# Patient Record
Sex: FEMALE | Race: WHITE | NOT HISPANIC OR LATINO | Employment: UNEMPLOYED | URBAN - METROPOLITAN AREA
[De-identification: names, ages, dates, MRNs, and addresses within clinical notes are randomized per-mention and may not be internally consistent; named-entity substitution may affect disease eponyms.]

---

## 2023-12-28 ENCOUNTER — APPOINTMENT (OUTPATIENT)
Dept: RADIOLOGY | Facility: MEDICAL CENTER | Age: 63
DRG: 441 | End: 2023-12-28
Attending: STUDENT IN AN ORGANIZED HEALTH CARE EDUCATION/TRAINING PROGRAM
Payer: MEDICAID

## 2023-12-28 ENCOUNTER — APPOINTMENT (OUTPATIENT)
Dept: RADIOLOGY | Facility: MEDICAL CENTER | Age: 63
DRG: 441 | End: 2023-12-28
Attending: INTERNAL MEDICINE
Payer: MEDICAID

## 2023-12-28 ENCOUNTER — HOSPITAL ENCOUNTER (INPATIENT)
Facility: MEDICAL CENTER | Age: 63
LOS: 6 days | DRG: 441 | End: 2024-01-03
Attending: STUDENT IN AN ORGANIZED HEALTH CARE EDUCATION/TRAINING PROGRAM | Admitting: INTERNAL MEDICINE
Payer: MEDICAID

## 2023-12-28 DIAGNOSIS — K70.11 ALCOHOLIC HEPATITIS WITH ASCITES: ICD-10-CM

## 2023-12-28 DIAGNOSIS — J96.21 ACUTE ON CHRONIC RESPIRATORY FAILURE WITH HYPOXIA AND HYPERCAPNIA (HCC): ICD-10-CM

## 2023-12-28 DIAGNOSIS — Z87.19 HISTORY OF CIRRHOSIS: ICD-10-CM

## 2023-12-28 DIAGNOSIS — N17.9 ACUTE KIDNEY INJURY (HCC): ICD-10-CM

## 2023-12-28 DIAGNOSIS — K74.60 CIRRHOSIS OF LIVER WITH ASCITES, UNSPECIFIED HEPATIC CIRRHOSIS TYPE (HCC): ICD-10-CM

## 2023-12-28 DIAGNOSIS — J44.9 CHRONIC OBSTRUCTIVE PULMONARY DISEASE, UNSPECIFIED COPD TYPE (HCC): ICD-10-CM

## 2023-12-28 DIAGNOSIS — E87.5 HYPERKALEMIA: ICD-10-CM

## 2023-12-28 DIAGNOSIS — D75.1 ERYTHROCYTOSIS: ICD-10-CM

## 2023-12-28 DIAGNOSIS — J96.01 ACUTE HYPOXEMIC RESPIRATORY FAILURE (HCC): ICD-10-CM

## 2023-12-28 DIAGNOSIS — R18.8 CIRRHOSIS OF LIVER WITH ASCITES, UNSPECIFIED HEPATIC CIRRHOSIS TYPE (HCC): ICD-10-CM

## 2023-12-28 DIAGNOSIS — L03.116 CELLULITIS OF LEFT LOWER EXTREMITY: ICD-10-CM

## 2023-12-28 DIAGNOSIS — R60.1 ANASARCA: ICD-10-CM

## 2023-12-28 DIAGNOSIS — J96.22 ACUTE ON CHRONIC RESPIRATORY FAILURE WITH HYPOXIA AND HYPERCAPNIA (HCC): ICD-10-CM

## 2023-12-28 DIAGNOSIS — E11.9 TYPE 2 DIABETES MELLITUS WITHOUT COMPLICATION, WITHOUT LONG-TERM CURRENT USE OF INSULIN (HCC): ICD-10-CM

## 2023-12-28 DIAGNOSIS — I27.20 PULMONARY HYPERTENSION (HCC): ICD-10-CM

## 2023-12-28 PROBLEM — G93.40 ENCEPHALOPATHY ACUTE: Status: ACTIVE | Noted: 2023-12-28

## 2023-12-28 PROBLEM — J96.90 RESPIRATORY FAILURE (HCC): Status: ACTIVE | Noted: 2023-12-28

## 2023-12-28 PROBLEM — R79.89 ELEVATED TROPONIN: Status: ACTIVE | Noted: 2023-12-28

## 2023-12-28 LAB
ALBUMIN FLD-MCNC: 2.2 G/DL
ALBUMIN SERPL BCP-MCNC: 4 G/DL (ref 3.2–4.9)
ALBUMIN/GLOB SERPL: 1.3 G/DL
ALP SERPL-CCNC: 122 U/L (ref 30–99)
ALT SERPL-CCNC: 11 U/L (ref 2–50)
AMMONIA PLAS-SCNC: 25 UMOL/L (ref 11–45)
AMPHET UR QL SCN: NEGATIVE
ANION GAP SERPL CALC-SCNC: 11 MMOL/L (ref 7–16)
ANION GAP SERPL CALC-SCNC: 12 MMOL/L (ref 7–16)
ANION GAP SERPL CALC-SCNC: 14 MMOL/L (ref 7–16)
ANION GAP SERPL CALC-SCNC: 15 MMOL/L (ref 7–16)
APPEARANCE FLD: NORMAL
APPEARANCE UR: CLEAR
AST SERPL-CCNC: 28 U/L (ref 12–45)
BACTERIA #/AREA URNS HPF: NEGATIVE /HPF
BARBITURATES UR QL SCN: NEGATIVE
BASE EXCESS BLDV CALC-SCNC: -6 MMOL/L
BASOPHILS # BLD AUTO: 0.6 % (ref 0–1.8)
BASOPHILS # BLD: 0.06 K/UL (ref 0–0.12)
BENZODIAZ UR QL SCN: NEGATIVE
BILIRUB SERPL-MCNC: 1.3 MG/DL (ref 0.1–1.5)
BILIRUB UR QL STRIP.AUTO: NEGATIVE
BODY FLD TYPE: NORMAL
BODY FLD TYPE: NORMAL
BODY TEMPERATURE: 36.4 CENTIGRADE
BUN SERPL-MCNC: 51 MG/DL (ref 8–22)
BUN SERPL-MCNC: 52 MG/DL (ref 8–22)
BUN SERPL-MCNC: 56 MG/DL (ref 8–22)
BUN SERPL-MCNC: 56 MG/DL (ref 8–22)
BUN SERPL-MCNC: 57 MG/DL (ref 8–22)
BUN SERPL-MCNC: 57 MG/DL (ref 8–22)
BZE UR QL SCN: NEGATIVE
CALCIUM ALBUM COR SERPL-MCNC: 9.1 MG/DL (ref 8.5–10.5)
CALCIUM SERPL-MCNC: 8.6 MG/DL (ref 8.5–10.5)
CALCIUM SERPL-MCNC: 9 MG/DL (ref 8.5–10.5)
CALCIUM SERPL-MCNC: 9.1 MG/DL (ref 8.5–10.5)
CALCIUM SERPL-MCNC: 9.2 MG/DL (ref 8.5–10.5)
CANNABINOIDS UR QL SCN: NEGATIVE
CELLS FLD: 9
CHLORIDE SERPL-SCNC: 100 MMOL/L (ref 96–112)
CHLORIDE SERPL-SCNC: 101 MMOL/L (ref 96–112)
CHLORIDE SERPL-SCNC: 102 MMOL/L (ref 96–112)
CK SERPL-CCNC: 37 U/L (ref 0–154)
CO2 SERPL-SCNC: 20 MMOL/L (ref 20–33)
CO2 SERPL-SCNC: 21 MMOL/L (ref 20–33)
CO2 SERPL-SCNC: 22 MMOL/L (ref 20–33)
CO2 SERPL-SCNC: 23 MMOL/L (ref 20–33)
CO2 SERPL-SCNC: 25 MMOL/L (ref 20–33)
CO2 SERPL-SCNC: 25 MMOL/L (ref 20–33)
COLOR FLD: YELLOW
COLOR UR: YELLOW
CREAT SERPL-MCNC: 1.39 MG/DL (ref 0.5–1.4)
CREAT SERPL-MCNC: 1.39 MG/DL (ref 0.5–1.4)
CREAT SERPL-MCNC: 1.48 MG/DL (ref 0.5–1.4)
CREAT SERPL-MCNC: 1.49 MG/DL (ref 0.5–1.4)
CREAT SERPL-MCNC: 1.5 MG/DL (ref 0.5–1.4)
CREAT SERPL-MCNC: 1.57 MG/DL (ref 0.5–1.4)
CREAT UR-MCNC: 105.82 MG/DL
CREAT UR-MCNC: 36.02 MG/DL
CRP SERPL HS-MCNC: 7.85 MG/DL (ref 0–0.75)
D DIMER PPP IA.FEU-MCNC: 4.02 UG/ML (FEU) (ref 0–0.5)
EKG IMPRESSION: NORMAL
EKG IMPRESSION: NORMAL
EOSINOPHIL # BLD AUTO: 0.02 K/UL (ref 0–0.51)
EOSINOPHIL NFR BLD: 0.2 % (ref 0–6.9)
EPI CELLS #/AREA URNS HPF: NEGATIVE /HPF
ERYTHROCYTE [DISTWIDTH] IN BLOOD BY AUTOMATED COUNT: 61.7 FL (ref 35.9–50)
EST. AVERAGE GLUCOSE BLD GHB EST-MCNC: 137 MG/DL
FENTANYL UR QL: NEGATIVE
FLUAV RNA SPEC QL NAA+PROBE: NEGATIVE
FLUBV RNA SPEC QL NAA+PROBE: NEGATIVE
GFR SERPLBLD CREATININE-BSD FMLA CKD-EPI: 37 ML/MIN/1.73 M 2
GFR SERPLBLD CREATININE-BSD FMLA CKD-EPI: 39 ML/MIN/1.73 M 2
GFR SERPLBLD CREATININE-BSD FMLA CKD-EPI: 39 ML/MIN/1.73 M 2
GFR SERPLBLD CREATININE-BSD FMLA CKD-EPI: 40 ML/MIN/1.73 M 2
GFR SERPLBLD CREATININE-BSD FMLA CKD-EPI: 43 ML/MIN/1.73 M 2
GFR SERPLBLD CREATININE-BSD FMLA CKD-EPI: 43 ML/MIN/1.73 M 2
GLOBULIN SER CALC-MCNC: 3.2 G/DL (ref 1.9–3.5)
GLUCOSE BLD STRIP.AUTO-MCNC: 107 MG/DL (ref 65–99)
GLUCOSE BLD STRIP.AUTO-MCNC: 112 MG/DL (ref 65–99)
GLUCOSE BLD STRIP.AUTO-MCNC: 95 MG/DL (ref 65–99)
GLUCOSE FLD-MCNC: 92 MG/DL
GLUCOSE SERPL-MCNC: 115 MG/DL (ref 65–99)
GLUCOSE SERPL-MCNC: 115 MG/DL (ref 65–99)
GLUCOSE SERPL-MCNC: 131 MG/DL (ref 65–99)
GLUCOSE SERPL-MCNC: 91 MG/DL (ref 65–99)
GLUCOSE SERPL-MCNC: 92 MG/DL (ref 65–99)
GLUCOSE SERPL-MCNC: 93 MG/DL (ref 65–99)
GLUCOSE UR STRIP.AUTO-MCNC: NEGATIVE MG/DL
GRAM STN SPEC: NORMAL
HAV IGM SERPL QL IA: NORMAL
HBA1C MFR BLD: 6.4 % (ref 4–5.6)
HBV CORE IGM SER QL: NORMAL
HBV SURFACE AG SER QL: NORMAL
HCO3 BLDV-SCNC: 26 MMOL/L (ref 24–28)
HCT VFR BLD AUTO: 53 % (ref 37–47)
HCV AB SER QL: NORMAL
HGB BLD-MCNC: 16.7 G/DL (ref 12–16)
HYALINE CASTS #/AREA URNS LPF: ABNORMAL /LPF
IMM GRANULOCYTES # BLD AUTO: 0.1 K/UL (ref 0–0.11)
IMM GRANULOCYTES NFR BLD AUTO: 1.1 % (ref 0–0.9)
INHALED O2 FLOW RATE: ABNORMAL L/MIN
INR PPP: 1.47 (ref 0.87–1.13)
KETONES UR STRIP.AUTO-MCNC: NEGATIVE MG/DL
LACTATE SERPL-SCNC: 1.6 MMOL/L (ref 0.5–2)
LEUKOCYTE ESTERASE UR QL STRIP.AUTO: NEGATIVE
LYMPHOCYTES # BLD AUTO: 1.34 K/UL (ref 1–4.8)
LYMPHOCYTES NFR BLD: 14.2 % (ref 22–41)
LYMPHOCYTES NFR FLD: 10 %
MCH RBC QN AUTO: 34.2 PG (ref 27–33)
MCHC RBC AUTO-ENTMCNC: 31.5 G/DL (ref 32.2–35.5)
MCV RBC AUTO: 108.6 FL (ref 81.4–97.8)
METHADONE UR QL SCN: NEGATIVE
MICRO URNS: ABNORMAL
MONOCYTES # BLD AUTO: 1.67 K/UL (ref 0–0.85)
MONOCYTES NFR BLD AUTO: 17.7 % (ref 0–13.4)
MONOS+MACROS NFR FLD MANUAL: 64 %
NEUTROPHILS # BLD AUTO: 6.26 K/UL (ref 1.82–7.42)
NEUTROPHILS NFR BLD: 66.2 % (ref 44–72)
NEUTROPHILS NFR FLD: 17 %
NITRITE UR QL STRIP.AUTO: NEGATIVE
NRBC # BLD AUTO: 0 K/UL
NRBC BLD-RTO: 0 /100 WBC (ref 0–0.2)
NUC CELL # FLD: 416 CELLS/UL
OPIATES UR QL SCN: NEGATIVE
OXYCODONE UR QL SCN: NEGATIVE
PCO2 BLDV: 79.1 MMHG (ref 41–51)
PCO2 TEMP ADJ BLDV: 77.1 MMHG (ref 41–51)
PCP UR QL SCN: NEGATIVE
PH BLDV: 7.13 [PH] (ref 7.31–7.45)
PH TEMP ADJ BLDV: 7.14 [PH] (ref 7.31–7.45)
PH UR STRIP.AUTO: 5 [PH] (ref 5–8)
PLATELET # BLD AUTO: 180 K/UL (ref 164–446)
PMV BLD AUTO: 10.4 FL (ref 9–12.9)
PO2 BLDV: 48.8 MMHG (ref 25–40)
PO2 TEMP ADJ BLDV: 46.8 MMHG (ref 25–40)
POTASSIUM SERPL-SCNC: 5.2 MMOL/L (ref 3.6–5.5)
POTASSIUM SERPL-SCNC: 5.3 MMOL/L (ref 3.6–5.5)
POTASSIUM SERPL-SCNC: 5.6 MMOL/L (ref 3.6–5.5)
POTASSIUM SERPL-SCNC: 5.8 MMOL/L (ref 3.6–5.5)
POTASSIUM SERPL-SCNC: 6.1 MMOL/L (ref 3.6–5.5)
POTASSIUM SERPL-SCNC: 7 MMOL/L (ref 3.6–5.5)
PROCALCITONIN SERPL-MCNC: 0.28 NG/ML
PROPOXYPH UR QL SCN: NEGATIVE
PROT SERPL-MCNC: 7.2 G/DL (ref 6–8.2)
PROT UR QL STRIP: 100 MG/DL
PROT UR-MCNC: 11 MG/DL (ref 0–15)
PROT/CREAT UR: 305 MG/G (ref 10–107)
PROTHROMBIN TIME: 18 SEC (ref 12–14.6)
RBC # BLD AUTO: 4.88 M/UL (ref 4.2–5.4)
RBC # FLD: 2000 CELLS/UL
RBC # URNS HPF: ABNORMAL /HPF
RBC UR QL AUTO: ABNORMAL
RSV RNA SPEC QL NAA+PROBE: NEGATIVE
SAO2 % BLDV: 73.2 %
SARS-COV-2 RNA RESP QL NAA+PROBE: NOTDETECTED
SCCMEC + MECA PNL NOSE NAA+PROBE: NEGATIVE
SIGNIFICANT IND 70042: NORMAL
SITE SITE: NORMAL
SODIUM SERPL-SCNC: 135 MMOL/L (ref 135–145)
SODIUM SERPL-SCNC: 137 MMOL/L (ref 135–145)
SODIUM SERPL-SCNC: 138 MMOL/L (ref 135–145)
SODIUM SERPL-SCNC: 138 MMOL/L (ref 135–145)
SODIUM UR-SCNC: 57 MMOL/L
SOURCE SOURCE: NORMAL
SP GR UR STRIP.AUTO: 1.01
SPECIMEN SOURCE: NORMAL
T4 FREE SERPL-MCNC: 0.98 NG/DL (ref 0.93–1.7)
T4 FREE SERPL-MCNC: 1.05 NG/DL (ref 0.93–1.7)
TROPONIN T SERPL-MCNC: 30 NG/L (ref 6–19)
TSH SERPL DL<=0.005 MIU/L-ACNC: 7.17 UIU/ML (ref 0.38–5.33)
UROBILINOGEN UR STRIP.AUTO-MCNC: 0.2 MG/DL
VIT B12 SERPL-MCNC: 3307 PG/ML (ref 211–911)
WBC # BLD AUTO: 9.5 K/UL (ref 4.8–10.8)
WBC #/AREA URNS HPF: ABNORMAL /HPF

## 2023-12-28 PROCEDURE — 80307 DRUG TEST PRSMV CHEM ANLYZR: CPT

## 2023-12-28 PROCEDURE — A9270 NON-COVERED ITEM OR SERVICE: HCPCS | Performed by: INTERNAL MEDICINE

## 2023-12-28 PROCEDURE — 99252 IP/OBS CONSLTJ NEW/EST SF 35: CPT | Performed by: INTERNAL MEDICINE

## 2023-12-28 PROCEDURE — 700102 HCHG RX REV CODE 250 W/ 637 OVERRIDE(OP): Performed by: INTERNAL MEDICINE

## 2023-12-28 PROCEDURE — 82945 GLUCOSE OTHER FLUID: CPT

## 2023-12-28 PROCEDURE — 96367 TX/PROPH/DG ADDL SEQ IV INF: CPT

## 2023-12-28 PROCEDURE — 82550 ASSAY OF CK (CPK): CPT

## 2023-12-28 PROCEDURE — 87070 CULTURE OTHR SPECIMN AEROBIC: CPT

## 2023-12-28 PROCEDURE — C1729 CATH, DRAINAGE: HCPCS

## 2023-12-28 PROCEDURE — 99291 CRITICAL CARE FIRST HOUR: CPT | Performed by: INTERNAL MEDICINE

## 2023-12-28 PROCEDURE — 94644 CONT INHLJ TX 1ST HOUR: CPT

## 2023-12-28 PROCEDURE — 82570 ASSAY OF URINE CREATININE: CPT

## 2023-12-28 PROCEDURE — 87641 MR-STAPH DNA AMP PROBE: CPT

## 2023-12-28 PROCEDURE — 85379 FIBRIN DEGRADATION QUANT: CPT

## 2023-12-28 PROCEDURE — 93010 ELECTROCARDIOGRAM REPORT: CPT | Performed by: STUDENT IN AN ORGANIZED HEALTH CARE EDUCATION/TRAINING PROGRAM

## 2023-12-28 PROCEDURE — 85610 PROTHROMBIN TIME: CPT

## 2023-12-28 PROCEDURE — 96375 TX/PRO/DX INJ NEW DRUG ADDON: CPT

## 2023-12-28 PROCEDURE — 87205 SMEAR GRAM STAIN: CPT

## 2023-12-28 PROCEDURE — 700105 HCHG RX REV CODE 258: Performed by: INTERNAL MEDICINE

## 2023-12-28 PROCEDURE — 99291 CRITICAL CARE FIRST HOUR: CPT

## 2023-12-28 PROCEDURE — 99292 CRITICAL CARE ADDL 30 MIN: CPT | Performed by: INTERNAL MEDICINE

## 2023-12-28 PROCEDURE — 86140 C-REACTIVE PROTEIN: CPT

## 2023-12-28 PROCEDURE — 82140 ASSAY OF AMMONIA: CPT

## 2023-12-28 PROCEDURE — 700111 HCHG RX REV CODE 636 W/ 250 OVERRIDE (IP): Mod: JZ | Performed by: INTERNAL MEDICINE

## 2023-12-28 PROCEDURE — 80074 ACUTE HEPATITIS PANEL: CPT

## 2023-12-28 PROCEDURE — 700102 HCHG RX REV CODE 250 W/ 637 OVERRIDE(OP): Performed by: STUDENT IN AN ORGANIZED HEALTH CARE EDUCATION/TRAINING PROGRAM

## 2023-12-28 PROCEDURE — 700111 HCHG RX REV CODE 636 W/ 250 OVERRIDE (IP): Performed by: STUDENT IN AN ORGANIZED HEALTH CARE EDUCATION/TRAINING PROGRAM

## 2023-12-28 PROCEDURE — 82042 OTHER SOURCE ALBUMIN QUAN EA: CPT

## 2023-12-28 PROCEDURE — 76775 US EXAM ABDO BACK WALL LIM: CPT

## 2023-12-28 PROCEDURE — 700105 HCHG RX REV CODE 258: Performed by: STUDENT IN AN ORGANIZED HEALTH CARE EDUCATION/TRAINING PROGRAM

## 2023-12-28 PROCEDURE — 85025 COMPLETE CBC W/AUTO DIFF WBC: CPT

## 2023-12-28 PROCEDURE — 36415 COLL VENOUS BLD VENIPUNCTURE: CPT

## 2023-12-28 PROCEDURE — 83036 HEMOGLOBIN GLYCOSYLATED A1C: CPT

## 2023-12-28 PROCEDURE — C9803 HOPD COVID-19 SPEC COLLECT: HCPCS | Performed by: STUDENT IN AN ORGANIZED HEALTH CARE EDUCATION/TRAINING PROGRAM

## 2023-12-28 PROCEDURE — 87040 BLOOD CULTURE FOR BACTERIA: CPT | Mod: 91

## 2023-12-28 PROCEDURE — 49082 ABD PARACENTESIS: CPT

## 2023-12-28 PROCEDURE — 700105 HCHG RX REV CODE 258

## 2023-12-28 PROCEDURE — 93970 EXTREMITY STUDY: CPT

## 2023-12-28 PROCEDURE — 82607 VITAMIN B-12: CPT

## 2023-12-28 PROCEDURE — 770022 HCHG ROOM/CARE - ICU (200)

## 2023-12-28 PROCEDURE — 93005 ELECTROCARDIOGRAM TRACING: CPT | Performed by: STUDENT IN AN ORGANIZED HEALTH CARE EDUCATION/TRAINING PROGRAM

## 2023-12-28 PROCEDURE — 307738 PARACENTESIS ABDOMINAL KIT

## 2023-12-28 PROCEDURE — 700101 HCHG RX REV CODE 250: Performed by: STUDENT IN AN ORGANIZED HEALTH CARE EDUCATION/TRAINING PROGRAM

## 2023-12-28 PROCEDURE — 84484 ASSAY OF TROPONIN QUANT: CPT

## 2023-12-28 PROCEDURE — 71045 X-RAY EXAM CHEST 1 VIEW: CPT

## 2023-12-28 PROCEDURE — 93970 EXTREMITY STUDY: CPT | Mod: 26 | Performed by: INTERNAL MEDICINE

## 2023-12-28 PROCEDURE — 94640 AIRWAY INHALATION TREATMENT: CPT

## 2023-12-28 PROCEDURE — 80053 COMPREHEN METABOLIC PANEL: CPT

## 2023-12-28 PROCEDURE — 83605 ASSAY OF LACTIC ACID: CPT

## 2023-12-28 PROCEDURE — 96376 TX/PRO/DX INJ SAME DRUG ADON: CPT

## 2023-12-28 PROCEDURE — 81001 URINALYSIS AUTO W/SCOPE: CPT

## 2023-12-28 PROCEDURE — 82803 BLOOD GASES ANY COMBINATION: CPT

## 2023-12-28 PROCEDURE — 80048 BASIC METABOLIC PNL TOTAL CA: CPT

## 2023-12-28 PROCEDURE — 700101 HCHG RX REV CODE 250: Performed by: INTERNAL MEDICINE

## 2023-12-28 PROCEDURE — 70450 CT HEAD/BRAIN W/O DYE: CPT

## 2023-12-28 PROCEDURE — 94660 CPAP INITIATION&MGMT: CPT

## 2023-12-28 PROCEDURE — 84300 ASSAY OF URINE SODIUM: CPT

## 2023-12-28 PROCEDURE — 96365 THER/PROPH/DIAG IV INF INIT: CPT

## 2023-12-28 PROCEDURE — 0241U HCHG SARS-COV-2 COVID-19 NFCT DS RESP RNA 4 TRGT MIC: CPT

## 2023-12-28 PROCEDURE — 84439 ASSAY OF FREE THYROXINE: CPT

## 2023-12-28 PROCEDURE — 89051 BODY FLUID CELL COUNT: CPT

## 2023-12-28 PROCEDURE — 99223 1ST HOSP IP/OBS HIGH 75: CPT | Performed by: INTERNAL MEDICINE

## 2023-12-28 PROCEDURE — 84443 ASSAY THYROID STIM HORMONE: CPT

## 2023-12-28 PROCEDURE — 84156 ASSAY OF PROTEIN URINE: CPT

## 2023-12-28 PROCEDURE — 84145 PROCALCITONIN (PCT): CPT

## 2023-12-28 PROCEDURE — 82962 GLUCOSE BLOOD TEST: CPT

## 2023-12-28 PROCEDURE — 700102 HCHG RX REV CODE 250 W/ 637 OVERRIDE(OP)

## 2023-12-28 PROCEDURE — 96372 THER/PROPH/DIAG INJ SC/IM: CPT

## 2023-12-28 RX ORDER — ONDANSETRON 4 MG/1
4 TABLET, ORALLY DISINTEGRATING ORAL EVERY 4 HOURS PRN
Status: DISCONTINUED | OUTPATIENT
Start: 2023-12-28 | End: 2024-01-03 | Stop reason: HOSPADM

## 2023-12-28 RX ORDER — PROMETHAZINE HYDROCHLORIDE 25 MG/1
12.5-25 TABLET ORAL EVERY 4 HOURS PRN
Status: DISCONTINUED | OUTPATIENT
Start: 2023-12-28 | End: 2024-01-03 | Stop reason: HOSPADM

## 2023-12-28 RX ORDER — FUROSEMIDE 10 MG/ML
80 INJECTION INTRAMUSCULAR; INTRAVENOUS EVERY 8 HOURS
Status: DISCONTINUED | OUTPATIENT
Start: 2023-12-28 | End: 2023-12-31

## 2023-12-28 RX ORDER — CALCIUM GLUCONATE 20 MG/ML
2 INJECTION, SOLUTION INTRAVENOUS ONCE
Status: COMPLETED | OUTPATIENT
Start: 2023-12-28 | End: 2023-12-28

## 2023-12-28 RX ORDER — ACETAMINOPHEN 325 MG/1
650 TABLET ORAL EVERY 6 HOURS PRN
Status: DISCONTINUED | OUTPATIENT
Start: 2023-12-28 | End: 2024-01-03 | Stop reason: HOSPADM

## 2023-12-28 RX ORDER — OXYCODONE HYDROCHLORIDE 5 MG/1
5 TABLET ORAL
Status: DISCONTINUED | OUTPATIENT
Start: 2023-12-28 | End: 2023-12-28

## 2023-12-28 RX ORDER — HYDROMORPHONE HYDROCHLORIDE 1 MG/ML
0.25 INJECTION, SOLUTION INTRAMUSCULAR; INTRAVENOUS; SUBCUTANEOUS
Status: DISCONTINUED | OUTPATIENT
Start: 2023-12-28 | End: 2023-12-28

## 2023-12-28 RX ORDER — HEPARIN SODIUM 5000 [USP'U]/ML
5000 INJECTION, SOLUTION INTRAVENOUS; SUBCUTANEOUS EVERY 8 HOURS
Status: DISCONTINUED | OUTPATIENT
Start: 2023-12-28 | End: 2023-12-29

## 2023-12-28 RX ORDER — MIDODRINE HYDROCHLORIDE 5 MG/1
5 TABLET ORAL EVERY 8 HOURS
Status: DISCONTINUED | OUTPATIENT
Start: 2023-12-28 | End: 2023-12-29

## 2023-12-28 RX ORDER — LABETALOL HYDROCHLORIDE 5 MG/ML
10 INJECTION, SOLUTION INTRAVENOUS EVERY 4 HOURS PRN
Status: DISCONTINUED | OUTPATIENT
Start: 2023-12-28 | End: 2024-01-03 | Stop reason: HOSPADM

## 2023-12-28 RX ORDER — AMOXICILLIN 250 MG
2 CAPSULE ORAL 2 TIMES DAILY
Status: DISCONTINUED | OUTPATIENT
Start: 2023-12-28 | End: 2024-01-03 | Stop reason: HOSPADM

## 2023-12-28 RX ORDER — ALBUTEROL SULFATE 90 UG/1
2 AEROSOL, METERED RESPIRATORY (INHALATION) EVERY 4 HOURS
Status: DISCONTINUED | OUTPATIENT
Start: 2023-12-28 | End: 2024-01-03 | Stop reason: HOSPADM

## 2023-12-28 RX ORDER — DEXMEDETOMIDINE HYDROCHLORIDE 4 UG/ML
.1-1.5 INJECTION, SOLUTION INTRAVENOUS CONTINUOUS
Status: DISCONTINUED | OUTPATIENT
Start: 2023-12-28 | End: 2023-12-30

## 2023-12-28 RX ORDER — ALBUMIN (HUMAN) 12.5 G/50ML
25 SOLUTION INTRAVENOUS 2 TIMES DAILY
Status: DISCONTINUED | OUTPATIENT
Start: 2023-12-28 | End: 2023-12-28

## 2023-12-28 RX ORDER — PROCHLORPERAZINE EDISYLATE 5 MG/ML
5-10 INJECTION INTRAMUSCULAR; INTRAVENOUS EVERY 4 HOURS PRN
Status: DISCONTINUED | OUTPATIENT
Start: 2023-12-28 | End: 2024-01-03 | Stop reason: HOSPADM

## 2023-12-28 RX ORDER — GABAPENTIN 300 MG/1
600 CAPSULE ORAL EVERY EVENING
Status: DISCONTINUED | OUTPATIENT
Start: 2023-12-28 | End: 2023-12-28

## 2023-12-28 RX ORDER — MIDODRINE HYDROCHLORIDE 5 MG/1
5 TABLET ORAL
Status: DISCONTINUED | OUTPATIENT
Start: 2023-12-28 | End: 2023-12-28

## 2023-12-28 RX ORDER — PROMETHAZINE HYDROCHLORIDE 25 MG/1
12.5-25 SUPPOSITORY RECTAL EVERY 4 HOURS PRN
Status: DISCONTINUED | OUTPATIENT
Start: 2023-12-28 | End: 2024-01-03 | Stop reason: HOSPADM

## 2023-12-28 RX ORDER — BISACODYL 10 MG
10 SUPPOSITORY, RECTAL RECTAL
Status: DISCONTINUED | OUTPATIENT
Start: 2023-12-28 | End: 2024-01-03 | Stop reason: HOSPADM

## 2023-12-28 RX ORDER — FUROSEMIDE 10 MG/ML
60 INJECTION INTRAMUSCULAR; INTRAVENOUS
Status: DISCONTINUED | OUTPATIENT
Start: 2023-12-28 | End: 2023-12-28

## 2023-12-28 RX ORDER — LACTULOSE 20 G/30ML
30 SOLUTION ORAL 3 TIMES DAILY
Status: DISCONTINUED | OUTPATIENT
Start: 2023-12-28 | End: 2023-12-29

## 2023-12-28 RX ORDER — OXYCODONE HYDROCHLORIDE 5 MG/1
2.5 TABLET ORAL
Status: DISCONTINUED | OUTPATIENT
Start: 2023-12-28 | End: 2023-12-28

## 2023-12-28 RX ORDER — POLYETHYLENE GLYCOL 3350 17 G/17G
1 POWDER, FOR SOLUTION ORAL
Status: DISCONTINUED | OUTPATIENT
Start: 2023-12-28 | End: 2024-01-03 | Stop reason: HOSPADM

## 2023-12-28 RX ORDER — ONDANSETRON 2 MG/ML
4 INJECTION INTRAMUSCULAR; INTRAVENOUS EVERY 4 HOURS PRN
Status: DISCONTINUED | OUTPATIENT
Start: 2023-12-28 | End: 2024-01-03 | Stop reason: HOSPADM

## 2023-12-28 RX ORDER — LINEZOLID 2 MG/ML
600 INJECTION, SOLUTION INTRAVENOUS EVERY 12 HOURS
Status: DISCONTINUED | OUTPATIENT
Start: 2023-12-28 | End: 2023-12-29

## 2023-12-28 RX ORDER — FUROSEMIDE 10 MG/ML
40 INJECTION INTRAMUSCULAR; INTRAVENOUS ONCE
Status: DISCONTINUED | OUTPATIENT
Start: 2023-12-28 | End: 2023-12-28

## 2023-12-28 RX ADMIN — SODIUM BICARBONATE 50 MEQ: 84 INJECTION, SOLUTION INTRAVENOUS at 04:54

## 2023-12-28 RX ADMIN — HEPARIN SODIUM 5000 UNITS: 5000 INJECTION, SOLUTION INTRAVENOUS; SUBCUTANEOUS at 13:44

## 2023-12-28 RX ADMIN — HEPARIN SODIUM 5000 UNITS: 5000 INJECTION, SOLUTION INTRAVENOUS; SUBCUTANEOUS at 06:32

## 2023-12-28 RX ADMIN — LACTULOSE 30 ML: 20 SOLUTION ORAL at 08:19

## 2023-12-28 RX ADMIN — RIFAXIMIN 550 MG: 550 TABLET ORAL at 08:20

## 2023-12-28 RX ADMIN — DOCUSATE SODIUM 50 MG AND SENNOSIDES 8.6 MG 2 TABLET: 8.6; 5 TABLET, FILM COATED ORAL at 17:45

## 2023-12-28 RX ADMIN — CALCIUM GLUCONATE 2 G: 20 INJECTION, SOLUTION INTRAVENOUS at 03:57

## 2023-12-28 RX ADMIN — DEXTROSE MONOHYDRATE 25 G: 100 INJECTION, SOLUTION INTRAVENOUS at 04:50

## 2023-12-28 RX ADMIN — INSULIN HUMAN 4.5 UNITS: 100 INJECTION, SOLUTION PARENTERAL at 03:54

## 2023-12-28 RX ADMIN — ALBUTEROL SULFATE 2 PUFF: 90 AEROSOL, METERED RESPIRATORY (INHALATION) at 13:01

## 2023-12-28 RX ADMIN — MIDODRINE HYDROCHLORIDE 5 MG: 5 TABLET ORAL at 08:20

## 2023-12-28 RX ADMIN — ALBUTEROL SULFATE 2 PUFF: 90 AEROSOL, METERED RESPIRATORY (INHALATION) at 10:39

## 2023-12-28 RX ADMIN — FUROSEMIDE 80 MG: 10 INJECTION, SOLUTION INTRAMUSCULAR; INTRAVENOUS at 13:44

## 2023-12-28 RX ADMIN — INSULIN HUMAN 4.5 UNITS: 100 INJECTION, SOLUTION PARENTERAL at 18:06

## 2023-12-28 RX ADMIN — FUROSEMIDE 60 MG: 10 INJECTION, SOLUTION INTRAMUSCULAR; INTRAVENOUS at 03:45

## 2023-12-28 RX ADMIN — MIDODRINE HYDROCHLORIDE 5 MG: 5 TABLET ORAL at 21:22

## 2023-12-28 RX ADMIN — RIFAXIMIN 550 MG: 550 TABLET ORAL at 17:45

## 2023-12-28 RX ADMIN — LINEZOLID 600 MG: 600 INJECTION, SOLUTION INTRAVENOUS at 17:42

## 2023-12-28 RX ADMIN — DEXMEDETOMIDINE 0.2 MCG/KG/HR: 100 INJECTION, SOLUTION INTRAVENOUS at 08:42

## 2023-12-28 RX ADMIN — Medication 10 MG: at 02:53

## 2023-12-28 RX ADMIN — LACTULOSE 30 ML: 20 SOLUTION ORAL at 12:43

## 2023-12-28 RX ADMIN — CEFEPIME 2 G: 2 INJECTION, POWDER, FOR SOLUTION INTRAVENOUS at 08:31

## 2023-12-28 RX ADMIN — FUROSEMIDE 80 MG: 10 INJECTION, SOLUTION INTRAMUSCULAR; INTRAVENOUS at 21:22

## 2023-12-28 RX ADMIN — ALBUTEROL SULFATE 2 PUFF: 90 AEROSOL, METERED RESPIRATORY (INHALATION) at 17:45

## 2023-12-28 RX ADMIN — LACTULOSE 30 ML: 20 SOLUTION ORAL at 17:45

## 2023-12-28 RX ADMIN — MIDODRINE HYDROCHLORIDE 5 MG: 5 TABLET ORAL at 13:44

## 2023-12-28 RX ADMIN — DEXTROSE MONOHYDRATE 25 G: 100 INJECTION, SOLUTION INTRAVENOUS at 17:41

## 2023-12-28 RX ADMIN — LINEZOLID 600 MG: 600 INJECTION, SOLUTION INTRAVENOUS at 06:27

## 2023-12-28 RX ADMIN — HEPARIN SODIUM 5000 UNITS: 5000 INJECTION, SOLUTION INTRAVENOUS; SUBCUTANEOUS at 21:22

## 2023-12-28 RX ADMIN — ALBUTEROL SULFATE 2 PUFF: 90 AEROSOL, METERED RESPIRATORY (INHALATION) at 21:31

## 2023-12-28 ASSESSMENT — ENCOUNTER SYMPTOMS
NAUSEA: 0
ROS GI COMMENTS: ABDOMINAL SWELLING
FEVER: 0
BACK PAIN: 0
SHORTNESS OF BREATH: 1
NECK PAIN: 0
EYE REDNESS: 0
ABDOMINAL PAIN: 0
SPEECH CHANGE: 0
SPUTUM PRODUCTION: 0
VOMITING: 0
HEADACHES: 0
CHILLS: 0
FLANK PAIN: 0
DEPRESSION: 0
WEAKNESS: 1
SORE THROAT: 0
DIZZINESS: 0
ORTHOPNEA: 1
EYE DISCHARGE: 0
PALPITATIONS: 0
COUGH: 0
BRUISES/BLEEDS EASILY: 0
NERVOUS/ANXIOUS: 0
SENSORY CHANGE: 0

## 2023-12-28 ASSESSMENT — PULMONARY FUNCTION TESTS
EPAP_CMH2O: 6

## 2023-12-28 ASSESSMENT — LIFESTYLE VARIABLES
TOTAL SCORE: 0
HAVE PEOPLE ANNOYED YOU BY CRITICIZING YOUR DRINKING: NO
EVER FELT BAD OR GUILTY ABOUT YOUR DRINKING: NO
AVERAGE NUMBER OF DAYS PER WEEK YOU HAVE A DRINK CONTAINING ALCOHOL: 0
HOW MANY TIMES IN THE PAST YEAR HAVE YOU HAD 5 OR MORE DRINKS IN A DAY: 0
TOTAL SCORE: 0
SUBSTANCE_ABUSE: 0
HAVE YOU EVER FELT YOU SHOULD CUT DOWN ON YOUR DRINKING: NO
ALCOHOL_USE: NO
TOTAL SCORE: 0
EVER HAD A DRINK FIRST THING IN THE MORNING TO STEADY YOUR NERVES TO GET RID OF A HANGOVER: NO
ON A TYPICAL DAY WHEN YOU DRINK ALCOHOL HOW MANY DRINKS DO YOU HAVE: 0
CONSUMPTION TOTAL: NEGATIVE

## 2023-12-28 ASSESSMENT — FIBROSIS 4 INDEX
FIB4 SCORE: 2.48
FIB4 SCORE: 2.95
FIB4 SCORE: 2.95

## 2023-12-28 ASSESSMENT — PAIN DESCRIPTION - PAIN TYPE
TYPE: ACUTE PAIN
TYPE: ACUTE PAIN

## 2023-12-28 ASSESSMENT — PATIENT HEALTH QUESTIONNAIRE - PHQ9
2. FEELING DOWN, DEPRESSED, IRRITABLE, OR HOPELESS: NOT AT ALL
SUM OF ALL RESPONSES TO PHQ9 QUESTIONS 1 AND 2: 0
1. LITTLE INTEREST OR PLEASURE IN DOING THINGS: NOT AT ALL

## 2023-12-28 NOTE — ED NOTES
ICU RN at bedside to transport pt upstairs, pt placed on portable monitor, belongings and paperwork at bedside

## 2023-12-28 NOTE — ASSESSMENT & PLAN NOTE
Clinically looks like demand ischemia  Trop marginally elevated   TTE pending  EKG reassuring  Cont Tele  No CP or apparent CP equivalent

## 2023-12-28 NOTE — ASSESSMENT & PLAN NOTE
Related to hypercarbic respiratory failure  Ammonia at 37  Avoid sedatives  Non focal exam  Improving

## 2023-12-28 NOTE — ED NOTES
Bedside report received by PRATIBHA Ficth    Oxygen:BiPap  Fall Risk status: bed in lowest position/locked, call light within reach  Patient Mentation:A+O x 4  Continuous cardiac monitoring:NSR  Report called by PRATIBHA Fitch to ICU RN, awaiting pt to be transported to floor

## 2023-12-28 NOTE — ASSESSMENT & PLAN NOTE
At this time she requires 5 to 8 L oxygen mask.  History of COPD, currently not in exacerbation  Etiology may include hepatopulmonary syndrome, congestive heart failure, pneumonia  Chest x-ray showed hazy bilateral opacities  BNP is elevated at 13,000  Plan to continue forced diuresis IV Lasix 60 mg twice daily  Obtain transthoracic echo, D-dimer  Monitor daily weight, input and output

## 2023-12-28 NOTE — H&P
Hospital Medicine History & Physical Note    Date of Service  12/28/2023    Primary Care Physician  Pcp Pt States None        Code Status  Full Code    Chief Complaint  Chief Complaint   Patient presents with    Shortness of Breath     BIB EMS; transfer from [place]; SOB x 1 week; denies any chest pain, N/V; AOX4 GCS15       History of Presenting Illness  Yas Bernard is a 63 y.o. female with history of cirrhosis, COPD, pulmonary hypertension, type 2 diabetes, ascending aortic aneurysm, nicotine dependence, remote history of alcohol abuse quit 10 years ago, who presented 12/28/2023 as a transfer from [facility] where she presented for evaluation for shortness of breath in the last 2 weeks worsening on exertion.  She was noted to have elevated creatinine and potassium in, as well as hypoxia and transferred here with concern for possible hepatorenal syndrome.  She had to be placed on 5 to 8 L oxygen mask for hypoxia with desaturation to 70%.  Reportedly she was taking Lasix at home without improvement of her shortness of breath and abdominal swelling.    She states she was taking ibuprofen on and off for occasional abdominal pain and bilateral leg pain.  Bedside paracentesis was done with at least 4 L of fluid removed.  Patient is falling asleep and could not stay Awake for conversation.  ERP has concern for possible underlying heart failure, given cardiomegaly on chest x-ray, bedside anasarca secondary to liver disease..    I discussed the plan of care with patient and ERP .    Review of Systems  Review of Systems   Unable to perform ROS: Mental acuity       Past Medical History   has a past medical history of Chronic obstructive pulmonary disease (HCC) and Cirrhosis (HCC).    Surgical History   has no past surgical history on file.     Family History  family history is not on file.   Family history reviewed with patient. There is no family history that is pertinent to the chief complaint.      Social History   reports that she has never smoked. She has never used smokeless tobacco. She reports that she does not currently use alcohol. She reports that she does not use drugs.    Allergies  Allergies   Allergen Reactions    Pcn [Penicillins]        Medications  Prior to Admission Medications   Prescriptions Last Dose Informant Patient Reported? Taking?   albuterol (VENTOLIN OR PROVENTIL) 108 (90 BASE) MCG/ACT AERS   No No   Sig: Inhale 2 Puffs by mouth every 4 hours.   clindamycin (CLEOCIN T) 1 % GEL   No No   Sig: Apply  to affected area(s) 2 Times a Day. use thin film on affected area   clobetasol (TEMOVATE) 0.05 % OINT   No No   Sig: Apply  to affected area(s) 2 Times a Day. Apply as directed above   diphenhydramine-ZnAcetate (BENADRYL ITCH) 1-0.1 % CREA   No No   Sig: Apply  to affected area(s) 2 Times a Day. Apply as directed   gabapentin (NEURONTIN) 300 MG Cap   Yes No   Sig: Take 600 mg by mouth 4 times a day.   pramoxine-calamine 1-8% (CALADRYL) lotion   No No   Sig: Apply 1 Inch to affected area(s) 4 times a day.      Facility-Administered Medications: None       Physical Exam  Temp:  [36.4 °C (97.6 °F)-36.6 °C (97.9 °F)] 36.6 °C (97.9 °F)  Pulse:  [75-81] 75  Resp:  [16-24] 17  BP: (114-138)/(60-79) 128/60  SpO2:  [70 %-94 %] 90 %  Blood Pressure: 128/60   Temperature: 36.6 °C (97.9 °F)   Pulse: 75   Respiration: 17   Pulse Oximetry: 90 %       Physical Exam  Vitals and nursing note reviewed.   Constitutional:       General: She is not in acute distress.     Appearance: Normal appearance.   HENT:      Head: Normocephalic and atraumatic.      Nose: Nose normal.      Mouth/Throat:      Mouth: Mucous membranes are moist.   Eyes:      Extraocular Movements: Extraocular movements intact.      Pupils: Pupils are equal, round, and reactive to light.   Cardiovascular:      Rate and Rhythm: Normal rate and regular rhythm.   Pulmonary:      Effort: Pulmonary effort is normal.      Breath sounds: Normal  breath sounds.   Abdominal:      General: Abdomen is flat. There is distension.      Tenderness: There is no abdominal tenderness. There is no guarding or rebound.   Musculoskeletal:         General: No swelling or deformity. Normal range of motion.      Cervical back: Normal range of motion and neck supple.      Right lower leg: Edema present.      Left lower leg: Edema present.   Skin:     General: Skin is warm and dry.   Neurological:      General: No focal deficit present.      Comments: Somnolent, unable to maintain conversation.  Moves all 4 extremities.  No lateralizing neurological signs.   Psychiatric:         Mood and Affect: Mood normal.         Behavior: Behavior normal.         Laboratory:  Recent Labs     12/27/23 2130 12/28/23 0034   WBC 8.4 9.5   RBC 5.00 4.88   HEMOGLOBIN 17.3* 16.7*   HEMATOCRIT 54.3* 53.0*   .6* 108.6*   MCH 34.6* 34.2*   MCHC 31.9* 31.5*   RDW 15.2* 61.7*   PLATELETCT 183 180   MPV 10.0 10.4     Recent Labs     12/27/23 2130 12/28/23 0034   SODIUM 135* 137   POTASSIUM 5.9* 6.1*   CHLORIDE 101 101   CO2 26 22   GLUCOSE 90 93   BUN 58* 57*   CREATININE 2.0* 1.50*   CALCIUM 9.3 9.1     Recent Labs     12/27/23 2130 12/28/23 0034   ALTSGPT 13 11   ASTSGOT 26 28   ALKPHOSPHAT 118* 122*   TBILIRUBIN 1.5* 1.3   DBILIRUBIN 0.9*  --    GLUCOSE 90 93     Recent Labs     12/27/23 2130 12/28/23 0034   INR 1.62 1.47*     Recent Labs     12/27/23 2130   NTPROBNP 92600*         Recent Labs     12/28/23  0034   TROPONINT 30*       Imaging:  DX-CHEST-LIMITED (1 VIEW)   Final Result         No pulmonary infiltrates or consolidations are noted.      Cardiomegaly.      EC-ECHOCARDIOGRAM COMPLETE W/O CONT    (Results Pending)       X-Ray:  I have personally reviewed the images and compared with prior images.    Assessment/Plan:  Justification for Admission Status  I anticipate this patient will require at least two midnights for appropriate medical management, necessitating inpatient  admission because heart failure, respiratory failure    Patient will need a Telemetry bed on MEDICAL service .  The need is secondary to respiratory failure, heart failure.    * Acute respiratory failure with hypoxia- (present on admission)  Assessment & Plan  At this time she requires 5 to 8 L oxygen mask.  History of COPD, currently not in exacerbation  Etiology may include hepatopulmonary syndrome, congestive heart failure, pneumonia  Chest x-ray showed hazy bilateral opacities  BNP is elevated at 13,000  Plan to continue forced diuresis IV Lasix 60 mg twice daily  Obtain transthoracic echo, D-dimer  Monitor daily weight, input and output      Acute kidney injury (HCC)  Assessment & Plan  Per chart review has normal baseline kidney function  Creatinine 2.0/58, 1.5/57  Etiology may include prerenal, as well as hepatorenal syndrome and NSAID nephropathy  Since patient clinically volume overloaded, will treat with IV Lasix  Midodrine and albumin infusions for possible hepatorenal syndrome  Bladder scan and kidney ultrasound to rule out obstruction    Encephalopathy acute  Assessment & Plan  Acute metabolic versus toxic  Plan: To check ammonia, vitamin B12, TSH, bladder scan, CT head  Fall, aspiration precautions    Hyperkalemia  Assessment & Plan  Potassium 6.1  ?  Secondary to above  EKG showed low voltage, nonspecific hyperkalemic changes  Patient received treatment with calcium gluconate, Lasix and albuterol in ER  Continue Lasix  Renal diet  Repeat chemistry panel    Elevated troponin  Assessment & Plan  Concern for possible underlying cardiomyopathy  Denies chest pain.  EKG showed low voltage.  Will repeat troponin and obtain transthoracic echo    COPD (chronic obstructive pulmonary disease) (HCC)- (present on admission)  Assessment & Plan  Not in exacerbation  Albuterol as needed  Supplemental oxygen    ASCITES  Assessment & Plan  Secondary to known liver cirrhosis  Status post evacuation of at least 4 L in ER  of transparent  brown liquid  Follow fluid analysis, consider empiric antibiotics for SBP if meets criteria  Albumin infusions          VTE prophylaxis: heparin ppx

## 2023-12-28 NOTE — CARE PLAN
The patient is Watcher - Medium risk of patient condition declining or worsening    Shift Goals  Clinical Goals: monitor labs, oxygenation  Patient Goals: eat  Family Goals: ANA    Progress made toward(s) clinical / shift goals:    Problem: Knowledge Deficit - Standard  Goal: Patient and family/care givers will demonstrate understanding of plan of care, disease process/condition, diagnostic tests and medications  Description: Target End Date:  1-3 days or as soon as patient condition allows    Document in Patient Education    1.  Patient and family/caregiver oriented to unit, equipment, visitation policy and means for communicating concern  2.  Complete/review Learning Assessment  3.  Assess knowledge level of disease process/condition, treatment plan, diagnostic tests and medications  4.  Explain disease process/condition, treatment plan, diagnostic tests and medications  Outcome: Progressing     Problem: Psychosocial  Goal: Patient's level of anxiety will decrease  Description: Target End Date:  1-3 days or as soon as patient condition allows    1.  Collaborate with patient and family/caregiver to identify triggers and develop strategies to cope with anxiety  2.  Implement stimuli reduction, calming techniques  3.  Pharmacologic management per provider order  4.  Encourage patient/family/care giver participation  5.  Collaborate with interdisciplinary team including Psychologist or Behavioral Health Team as needed  Outcome: Progressing     Problem: Pain - Standard  Goal: Alleviation of pain or a reduction in pain to the patient’s comfort goal  Description: Target End Date:  Prior to discharge or change in level of care    Document on Vitals flowsheet    1.  Document pain using the appropriate pain scale per order or unit policy  2.  Educate and implement non-pharmacologic comfort measures (i.e. relaxation, distraction, massage, cold/heat therapy, etc.)  3.  Pain management medications as ordered  4.  Reassess pain  after pain med administration per policy  5.  If opiods administered assess patient's response to pain medication is appropriate per POSS sedation scale  6.  Follow pain management plan developed in collaboration with patient and interdisciplinary team (including palliative care or pain specialists if applicable)  Outcome: Progressing       Patient is not progressing towards the following goals:

## 2023-12-28 NOTE — ASSESSMENT & PLAN NOTE
S/p 4L paracentesis in ER  No signs to suggest SBP  Cont aggressive diuresis with Lasix 80mg every 8 hours  Assess for needs for repeat paracentesis

## 2023-12-28 NOTE — CONSULTS
Date of Consultation:  12/28/2023    Patient: : Yas Bernard  MRN: [id number]    Referring Physician:  Dr. Adan Charlton     GI:Michelle Sierra M.D.     Reason for Consultation: Liver cirrhosis.    History of Present Illness:   The patient is a 63 years old female with medical history of cirrhosis possibly from fatty liver and alcohol use.  The patient presented to outside hospital for fatigue, shortness of breath for about 1 to 2 weeks.  The patient also feel distended in the abdomen.    At outside hospital, liver function suboptimal but relative stable however acute kidney injury with electrolyte abnormality noted.  The patient was sent to [facility] for higher level care.    We saw the patient at bedside in early morning, the patient has being BiPAP for breathing, 4 L of ascites was removed.    No acute upper GI symptoms or sign.      Past Medical History:   Diagnosis Date    Chronic obstructive pulmonary disease (HCC)     Cirrhosis (HCC)          History reviewed. No pertinent surgical history.    History reviewed. No pertinent family history.    Social History     Socioeconomic History    Marital status:    Tobacco Use    Smoking status: Never    Smokeless tobacco: Never   Vaping Use    Vaping Use: Never used   Substance and Sexual Activity    Alcohol use: Not Currently    Drug use: Never     Limited review of system because patient was sleeping with BiPAP.    HEENT: grossly normal.  Cardiovascular: Please refer to primary team's daily assessment.   Lungs: Please refer to primary team's daily assessment.   Abdomen: Soft, No tenderness, distended.  Skin: No erythema, No rash.          Physical Exam:  Vitals:    12/28/23 0540 12/28/23 0600 12/28/23 0632 12/28/23 0700   BP:  117/59  126/60   Pulse: 68 70 72 73   Resp: 20 20 18 18   Temp:       TempSrc:       SpO2: 92% 88% 93% 95%   Weight:       Height:                 Labs:  Recent Labs     12/27/23  2130 12/28/23  0034   WBC 8.4 9.5   RBC 5.00 4.88    HEMOGLOBIN 17.3* 16.7*   HEMATOCRIT 54.3* 53.0*   .6* 108.6*   MCH 34.6* 34.2*   MCHC 31.9* 31.5*   RDW 15.2* 61.7*   PLATELETCT 183 180   MPV 10.0 10.4     Recent Labs     12/28/23  0034 12/28/23  0253 12/28/23  0522   SODIUM 137 138 137   POTASSIUM 6.1* 7.0* 5.2   CHLORIDE 101 101 101   CO2 22 23 25   GLUCOSE 93 91 131*   BUN 57* 57* 56*   CPKTOTAL  --   --  37     Recent Labs     12/27/23 2130 12/28/23 0034   INR 1.62 1.47*       Recent Labs     12/27/23 2130 12/28/23 0034   ASTSGOT 26 28   ALTSGPT 13 11   TBILIRUBIN 1.5* 1.3   IBILIRUBIN 0.6  --    DBILIRUBIN 0.9*  --    ALKPHOSPHAT 118* 122*   GLOBULIN  --  3.2   INR 1.62 1.47*         Imaging:  CT-HEAD W/O  Narrative: 12/28/2023 5:19 AM    HISTORY/REASON FOR EXAM:  SHORTNESS OF BREATH.    TECHNIQUE/EXAM DESCRIPTION AND NUMBER OF VIEWS:  CT of the head without contrast.    The study was performed on a helical multidetector CT scanner. Contiguous axial sections were obtained from the skull base through the vertex. Up to date radiation dose reduction adjustments have been utilized to meet ALARA standards for radiation dose   reduction.    COMPARISON:  None.    FINDINGS:  Brain: No intracranial mass, hydrocephalus, herniation, hemorrhage, or extra-axial fluid collection. Normal gray-white matter differentiation.    Paranasal sinuses: Visualized portions of the paranasal sinuses and mastoid air cells are well aerated.  Impression: No acute process.  US-RENAL  Narrative: 12/28/2023 3:46 AM    HISTORY/REASON FOR EXAM:  Abnormal Labs    TECHNIQUE/EXAM DESCRIPTION:  Renal ultrasound.    COMPARISON:  None    FINDINGS:    RIGHT KIDNEY: No renal stone, hydronephrosis, or mass identified. Normal echogenicity.. The right kidney length measures 11.96 cm.    LEFT KIDNEY: No renal stone, hydronephrosis, or mass identified. Normal echogenicity.. The left kidney length measures 10.18 cm.    BLADDER: Love catheter balloon is present within a decompressed urinary  bladder.    VESSELS: Aortic and iliac arteries are normal diameter. Visualized portion of IVC appears normal.  Impression: Normal exam.  DX-CHEST-LIMITED (1 VIEW)  Narrative: 12/28/2023 12:40 AM    HISTORY/REASON FOR EXAM:  Shortness of Breath    TECHNIQUE/EXAM DESCRIPTION AND NUMBER OF VIEWS:  Single portable view of the chest.    COMPARISON: 6/10/2012    FINDINGS:    No pulmonary infiltrates or consolidations are noted.  No pleural effusion. No pneumothorax.  Enlarged cardiopericardial silhouette.  Impression: No pulmonary infiltrates or consolidations are noted.    Cardiomegaly.            Impressions:  The patient is a 63 years old female with medical history of cirrhosis possibly from fatty liver and alcohol use.  The patient presented to outside hospital for fatigue, shortness of breath for about 1 to 2 weeks.  The patient also feel distended in the abdomen.    Liver function, liver reserve is suboptimal due to cirrhosis however no overt acute change at this time.  Kidney injury is the acute issue, possibly hepatorenal syndrome or other etiology.    GI team agree current fluid status management by primary team, would appreciate nephrology consultation.    No other acute management change from a GI standpoint GI team will continue to follow-up.      This note was generated using voice recognition software which has a small chance of producing errors of grammar and possibly content. I have made every reasonable attempt to find and correct any obvious errors, but expect that some may not be found prior to finalization of this note.

## 2023-12-28 NOTE — ED NOTES
Drained 4L abdominal fluid; paracentesis needle removed by ERP; no bleeding noted on the insertion site; patient tolerated well

## 2023-12-28 NOTE — ASSESSMENT & PLAN NOTE
Hx of COPD   Likely symptoms of pulmonary hypertension causing shortness of breath  Check lower ext doppler: negative for DVT  BiPAP/CPAP at night  No wheezing on exam RT protocols  Goal sat 90-92% with her pulmonary hypertension  Continue aggressive diuresis, diamox 500mg IV x 1

## 2023-12-28 NOTE — PROGRESS NOTES
Critical Care Progress Note    Date of admission  12/28/2023    Chief Complaint  63 y.o. female admitted 12/28/2023 with acute hypoxic and hypercapnic respiratory failure requiring BiPAP    Hospital Course  Ms. Bernard is a 63 year old lady with the past medical history significant for pulmonary hypertension, cirrhosis due to alcohol abuse (quit 10 years ago) with ascites, COPD, type-II diabetes, tobacco abuse, and an ascending aortic aneurysm who went to [facility] on 12/27 with complaints of shortness of breath and leg swelling for 2 weeks. She was found to be fluid overload with an elevated creatinine and potassium with an oxygen saturation of 70%.  She was started on supplemental oxygen and transferred to [facility] ER for subspecialty care.  The patient underwent a 4 liter paracentesis and was given lasix.  She started to become sleepy and a VBG revealed a pH of 7.13 with a pCO2 of 79.  She was started on BiPAP and then admitted to the ICU for ongoing care.    Interval Problem Update  Reviewed last 24 hour events:   - admitted just this morning   - A/ox4   - SR 60-70s   - -110s   - Tmax 97.3   - low salt diet   - UOP of 500cc since admit, Love   - BM pta   - AVAPs: IPAP 14, EPAP 6, 45%   - Precedex 0.2   - heparin   - cefepime/linezold   - K now 5.2   - Creat 1.49    Review of Systems  Review of Systems   Constitutional:  Positive for malaise/fatigue. Negative for chills and fever.   HENT:  Negative for congestion and sore throat.    Eyes:  Negative for discharge and redness.   Respiratory:  Positive for shortness of breath. Negative for cough and sputum production.    Cardiovascular:  Positive for orthopnea and leg swelling. Negative for chest pain and palpitations.   Gastrointestinal:  Negative for abdominal pain, nausea and vomiting.        Abdominal swelling    Genitourinary:  Negative for flank pain and hematuria.   Musculoskeletal:  Negative for back pain and neck pain.   Skin:  Negative  for rash.   Neurological:  Positive for weakness. Negative for dizziness, sensory change, speech change and headaches.   Endo/Heme/Allergies:  Does not bruise/bleed easily.   Psychiatric/Behavioral:  Negative for depression and substance abuse. The patient is not nervous/anxious.         Vital Signs for last 24 hours   Temp:  [36.6 °C (97.9 °F)-36.9 °C (98.4 °F)] 36.9 °C (98.4 °F)  Pulse:  [68-81] 81  Resp:  [17-20] 18  BP: (117-140)/(59-79) 126/60  SpO2:  [85 %-97 %] 94 %    Hemodynamic parameters for last 24 hours       Respiratory Information for the last 24 hours       Physical Exam   Physical Exam  Vitals and nursing note reviewed.   Constitutional:       Appearance: She is obese. She is ill-appearing.      Comments: Sleepy but wakes easily, pt appears much older than stated age and chronically ill   HENT:      Head: Normocephalic and atraumatic.      Right Ear: External ear normal.      Left Ear: External ear normal.      Nose: Nose normal.      Mouth/Throat:      Mouth: Mucous membranes are dry.      Comments: BiPAP in place  Eyes:      General: No scleral icterus.     Conjunctiva/sclera: Conjunctivae normal.      Pupils: Pupils are equal, round, and reactive to light.   Cardiovascular:      Rate and Rhythm: Normal rate and regular rhythm.      Heart sounds: Normal heart sounds. No murmur heard.  Pulmonary:      Breath sounds: No wheezing.      Comments: Breathing comfortably on BiPAP, diminished  Chest:      Chest wall: No tenderness.   Abdominal:      Palpations: Abdomen is soft.      Tenderness: There is no abdominal tenderness. There is no guarding or rebound.   Genitourinary:     Comments: Love in place  Musculoskeletal:         General: Normal range of motion.      Cervical back: Normal range of motion and neck supple.      Right lower leg: Edema present.      Left lower leg: Edema present.   Lymphadenopathy:      Cervical: No cervical adenopathy.   Skin:     General: Skin is warm and dry.       Capillary Refill: Capillary refill takes 2 to 3 seconds.      Findings: No rash.   Neurological:      Mental Status: She is alert and oriented to person, place, and time.      Cranial Nerves: No cranial nerve deficit.      Sensory: No sensory deficit.      Motor: No weakness.   Psychiatric:         Mood and Affect: Mood normal.         Behavior: Behavior normal.         Medications  Current Facility-Administered Medications   Medication Dose Route Frequency Provider Last Rate Last Admin    senna-docusate (Pericolace Or Senokot S) 8.6-50 MG per tablet 2 Tablet  2 Tablet Oral BID Zan Barth M.D.        And    polyethylene glycol/lytes (Miralax) Packet 1 Packet  1 Packet Oral QDAY PRN Zan Barth M.D.        And    magnesium hydroxide (Milk Of Magnesia) suspension 30 mL  30 mL Oral QDAY PRN Zan Barth M.D.        And    bisacodyl (Dulcolax) suppository 10 mg  10 mg Rectal QDAY PRN Zan aBrth M.D.        heparin injection 5,000 Units  5,000 Units Subcutaneous Q8HRS Zan Barth M.D.   5,000 Units at 12/28/23 0632    acetaminophen (Tylenol) tablet 650 mg  650 mg Oral Q6HRS PRN Zan Barth M.D.        Pharmacy Consult Request ...Pain Management Review 1 Each  1 Each Other PHARMACY TO DOSE Zan Barth M.D.        labetalol (Normodyne/Trandate) injection 10 mg  10 mg Intravenous Q4HRS PRN Zan Barth M.D.        ondansetron (Zofran) syringe/vial injection 4 mg  4 mg Intravenous Q4HRS PRN Zan Barth M.D.        ondansetron (Zofran ODT) dispertab 4 mg  4 mg Oral Q4HRS PRN Zan Barth M.D.        promethazine (Phenergan) tablet 12.5-25 mg  12.5-25 mg Oral Q4HRS PRN Zan Barth M.D.        promethazine (Phenergan) suppository 12.5-25 mg  12.5-25 mg Rectal Q4HRS PRN Zan Barth M.D.        prochlorperazine (Compazine) injection 5-10 mg  5-10 mg Intravenous Q4HRS PRN Zan Barth M.D.        midodrine (Proamatine) tablet 5 mg  5 mg Oral TID WITH MEALS  Zan Barth M.D.   5 mg at 12/28/23 0820    albuterol inhaler 2 Puff  2 Puff Inhalation Q4HRS Zan Barth M.D.        Pharmacy Consult Request - to monitor for nephrotoxic agents  1 Each Other PHARMACY TO DOSE Zan Barth M.D.        furosemide (Lasix) injection 80 mg  80 mg Intravenous Q8HRS Adan Charlton M.D.        Linezolid (Zyvox) premix 600 mg  600 mg Intravenous Q12HRS Adan Charlton M.D.   Stopped at 12/28/23 0727    cefepime (Maxipime) 2 g in  mL IVPB  2 g Intravenous Q24HRS Adan Charlton M.D.        riFAXIMin (Xifaxan) tablet 550 mg  550 mg Oral BID Adan Charlton M.D.   550 mg at 12/28/23 0820    lactulose 20 GM/30ML solution 30 mL  30 mL Oral TID Adan Charlton M.D.   30 mL at 12/28/23 0819    dexmedetomidine (PRECEDEX) 400 mcg/100mL NS premix infusion  0.1-1.5 mcg/kg/hr (Ideal) Intravenous Continuous Adan Charlton M.D.   Held at 12/28/23 0600    insulin regular (HumuLIN R,NovoLIN R) injection  2-9 Units Subcutaneous 4X/DAY ACHS Adan Charlton M.D.        And    dextrose 10 % BOLUS 25 g  25 g Intravenous Q15 MIN PRN Adan Charlton M.D.        Respiratory Therapy Consult   Nebulization Continuous RT Adan Charlton M.D.           Fluids  No intake or output data in the 24 hours ending 12/28/23 0831    Laboratory  Recent Labs     12/28/23  0253   [id number] 7L     Recent Labs     12/27/23  2130 12/28/23  0522   CPKTOTAL  --  37   TROPONINI 15.4  --      Recent Labs     12/28/23  0034 12/28/23  0253 12/28/23  0522   SODIUM 137 138 137   POTASSIUM 6.1* 7.0* 5.2   CHLORIDE 101 101 101   CO2 22 23 25   BUN 57* 57* 56*   CREATININE 1.50* 1.48* 1.49*   CALCIUM 9.1 9.1 9.2     Recent Labs     12/27/23 2130 12/28/23  0034 12/28/23  0253 12/28/23  0522   ALTSGPT 13 11  --   --    ASTSGOT 26 28  --   --    ALKPHOSPHAT 118* 122*  --   --    TBILIRUBIN 1.5* 1.3  --   --    DBILIRUBIN 0.9*  --   --   --    GLUCOSE 90 93 91 131*     Recent Labs     12/27/23 2130  12/28/23  0034   WBC 8.4 9.5   NEUTSPOLYS 64 66.20   LYMPHOCYTES 21.0 14.20*   MONOCYTES 14.0* 17.70*   EOSINOPHILS  --  0.20   BASOPHILS  --  0.60   ASTSGOT 26 28   ALTSGPT 13 11   ALKPHOSPHAT 118* 122*   TBILIRUBIN 1.5* 1.3     Recent Labs     12/27/23  2130 12/28/23  0034   RBC 5.00 4.88   HEMOGLOBIN 17.3* 16.7*   HEMATOCRIT 54.3* 53.0*   PLATELETCT 183 180   PROTHROMBTM 16.5* 18.0*   INR 1.62 1.47*       Imaging  Bilateral LE DVT study:   FINDINGS:   Bilateral lower extremities.    The peroneal and posterior tibial veins are difficult to assess for    compressibility, but flow response to augmentation is demonstrated.    All other veins demonstrate complete color filling and compressibility with    normal venous flow dynamics including spontaneous flow and respiratory    phasicity.    No deep venous thrombosis.    Edema reduces the image quality.    Interstitial fluid consistent with edema is observed in the thigh and below    the knee.    Renal US: negative    Assessment/Plan  Type 2 diabetes mellitus (HCC)  Assessment & Plan  BG goals 140-180s  Medium ISS  A1c of 6.4      Pulmonary hypertension (HCC)  Assessment & Plan  Hx of bedside pocus with congestion on right side  Follow up Echo  Check lower ext doppler  Force diuresis  Likely group 3 related  Consider CTA chest if concerns of PE once renal function improves    Erythrocytosis  Assessment & Plan  Likely from chronic hypoxia  Serial monitor need for phlebotomy    Cellulitis of left lower extremity  Assessment & Plan  Left lower leg cellulitis  MRSA nares, zyvox and cefepime  Lower ext doppler rule out vte    Cirrhosis (HCC)  Assessment & Plan  Hx of prior alcohol abuse, could also be congestive vs BOLAÑOS  Avoid hepatoxins  Ammonia at 37, cont lactulose and rifaximin  Repeat hepatitis panel prior in 2012 normal      Acute on chronic respiratory failure with hypoxia and hypercapnia (HCC)- (present on admission)  Assessment & Plan  Hx of COPD   Likely symptoms  of pulmonary hypertension causing shortness of breath  Check lower ext doppler  Continue Bipap with low Epap with pulmonary hypertension  No wheezing on exam RT protocols  Goal sat 90-92% with her pulmonary hypertension    Encephalopathy acute  Assessment & Plan  Related to hypercarbic respiratory failure  Ammonia at 37  Avoid sedatives  Non focal exam  Bipap  Aspiration precautions  Improving     Hyperkalemia  Assessment & Plan  S/p hyperkalemia rx in ER no EKG changes  Serial BMP Q4  High dose lasix  Monitor need for RRT  Consider Valtressa      Acute kidney injury (HCC)  Assessment & Plan  Concerning for cardiorenal syndrome with pulmonary hypertension vs hepatorenal  Maintain euvolemia and monitor fluid responsiveness (avoid NaCL and renal congestion)  MAP > 65: start midodrine 5mg every 8 hours  Monitor urine output and I&O's  Avoid and review nephrotoxin medication  Good UOP with mcleod  Renal US without hydro        COPD (chronic obstructive pulmonary disease) (HCC)- (present on admission)  Assessment & Plan  Hx of continue inhalers  RT protocol not in acute excerbation    Ascites- (present on admission)  Assessment & Plan  S/p 4L paracentesis in ER  No signs to suggest SBP  Cont Lasix  Assess for needs for repeat paracentesis         VTE:  Heparin  Ulcer: Not Indicated  Lines: Mcleod Catheter  Ongoing indication addressed and PIVs    I have performed a physical exam and reviewed and updated ROS and Plan today (12/28/2023). In review of yesterday's note (12/27/2023), there are no changes except as documented above.     Discussed patient condition and risk of morbidity and/or mortality with RN, RT, Pharmacy, [employer] resident, Charge nurse / hot rounds, and Patient    The patient remains critically ill at this time requiring active titration of BiPAP settings for acute on chronic hypoxic respiratory failure related to fluid overload and pulmonary hypertension.  I have assessed and reassessed the patient's  respiratory status, hemodynamics, cardiovascular status, and serial laboratories.  The patient remains at high risk of clinical deterioration, worsening vital organ dysfunction, and death without the above critical care interventions.    Additional critical care time to Dr. Charlton from earlier today = 105 minutes in directly providing and coordinating critical care and extensive data review.  No time overlap and excludes procedures.

## 2023-12-28 NOTE — HOSPITAL COURSE
Ms. Bernard is a 63 year old lady with the past medical history significant for pulmonary hypertension, cirrhosis due to alcohol abuse (quit 10 years ago) with ascites, COPD, type-II diabetes, tobacco abuse, and an ascending aortic aneurysm who went to [facility] on 12/27 with complaints of shortness of breath and leg swelling for 2 weeks. She was found to be fluid overload with an elevated creatinine and potassium with an oxygen saturation of 70%.  She was started on supplemental oxygen and transferred to [facility] ER for subspecialty care.  The patient underwent a 4 liter paracentesis and was given lasix.  She started to become sleepy and a VBG revealed a pH of 7.13 with a pCO2 of 79.  She was started on BiPAP and then admitted to the ICU for ongoing care.

## 2023-12-28 NOTE — ASSESSMENT & PLAN NOTE
Concerning for cardiorenal syndrome with pulmonary hypertension vs hepatorenal  Maintain euvolemia and monitor fluid responsiveness (avoid NaCL and renal congestion)  MAP > 65: cont midodrine at 10mg every 8 hours  Monitor urine output and I&O's  Avoid and review nephrotoxin medication  Good UOP with mcleod  Renal US without hydro  Marked improvement

## 2023-12-28 NOTE — CONSULTS
Critical Care Consultation    Date of consult: 12/28/2023    Referring Physician  Zan Barth M.D.    Reason for Consultation  Hypercarbic respiratory failure, hyperkalemia, isela, cirrhosis, right leg cellulitis    History of Presenting Illness  63 y.o. female who presented 12/28/2023 with Hx of pulmonary hypertension, cirrhosis from alcohol abuse has quit drinking some 10 yrs ago, ascites, COPD, DM2, tobacco abuse, ascending aortic aneurysm. She went to [facility] for leg swelling, and 2 weeks of SOB. She was found to have elevated cr and potassium and hypoxia to 70% and transferred here. She was seen by hospitalist and sleepy and VBG was done finding 7.13/79 and I was consulted. She tells me she was brought in for left leg pain and swelling. She is confused but following commands. She was also found to have K of 7. She had 4L paracentesis in ER with no signs to suggest SBP. Her bedside echo shows pulmonary hypertension and with good BiV function with mild pressure overload and D sign with congestion of hepatic system and IVC large 3.5cm with no respiratory change. With all the above findings I recommended ICU admission instead of ICU. Viral swab was negative.     Code Status  Full Code    Review of Systems  Review of Systems   Unable to perform ROS: Mental status change       Past Medical History   has a past medical history of Chronic obstructive pulmonary disease (HCC) and Cirrhosis (HCC).    Surgical History   has no past surgical history on file.    Family History  family history is not on file.    Social History   reports that she has never smoked. She has never used smokeless tobacco. She reports that she does not currently use alcohol. She reports that she does not use drugs.    Medications  Home Medications       Reviewed by Little Willis R.N. (Registered Nurse) on 12/28/23 at 0030  Med List Status: Not Addressed     Medication Last Dose Status   albuterol (VENTOLIN OR PROVENTIL) 108 (90 BASE)  MCG/ACT AERS  Active   clindamycin (CLEOCIN T) 1 % GEL  Active   clobetasol (TEMOVATE) 0.05 % OINT  Active   diphenhydramine-ZnAcetate (BENADRYL ITCH) 1-0.1 % CREA  Active   gabapentin (NEURONTIN) 300 MG Cap  Active   pramoxine-calamine 1-8% (CALADRYL) lotion  Active                  Current Facility-Administered Medications   Medication Dose Route Frequency Provider Last Rate Last Admin    senna-docusate (Pericolace Or Senokot S) 8.6-50 MG per tablet 2 Tablet  2 Tablet Oral BID Zan Barth M.D.        And    polyethylene glycol/lytes (Miralax) Packet 1 Packet  1 Packet Oral QDAY PRN Zan Barth M.D.        And    magnesium hydroxide (Milk Of Magnesia) suspension 30 mL  30 mL Oral QDAY PRN Zan Barth M.D.        And    bisacodyl (Dulcolax) suppository 10 mg  10 mg Rectal QDAY PRN Zan Barth M.D.        heparin injection 5,000 Units  5,000 Units Subcutaneous Q8HRS Zan Barth M.D.        acetaminophen (Tylenol) tablet 650 mg  650 mg Oral Q6HRS PRN Zan Barth M.D.        Pharmacy Consult Request ...Pain Management Review 1 Each  1 Each Other PHARMACY TO DOSE Zan Barth M.D.        labetalol (Normodyne/Trandate) injection 10 mg  10 mg Intravenous Q4HRS PRN Zan Barth M.D.        ondansetron (Zofran) syringe/vial injection 4 mg  4 mg Intravenous Q4HRS PRN Zan Barth M.D.        ondansetron (Zofran ODT) dispertab 4 mg  4 mg Oral Q4HRS PRN Zan Barth M.D.        promethazine (Phenergan) tablet 12.5-25 mg  12.5-25 mg Oral Q4HRS PRN Zan Barth M.D.        promethazine (Phenergan) suppository 12.5-25 mg  12.5-25 mg Rectal Q4HRS PRN Zan Barth M.D.        prochlorperazine (Compazine) injection 5-10 mg  5-10 mg Intravenous Q4HRS PRN Zan Barth M.D.        midodrine (Proamatine) tablet 5 mg  5 mg Oral TID WITH MEALS Zan Barth M.D.        albuterol inhaler 2 Puff  2 Puff Inhalation Q4HRS Zan Barth M.D.        Pharmacy Consult  Request - to monitor for nephrotoxic agents  1 Each Other PHARMACY TO DOSE Zan Barth M.D.        furosemide (Lasix) injection 80 mg  80 mg Intravenous Q8HRS Adan Charlton M.D.        Linezolid (Zyvox) premix 600 mg  600 mg Intravenous Q12HRS Adan Charlton M.D.        cefepime (Maxipime) 2 g in  mL IVPB  2 g Intravenous Q24HRS Adan Charlton M.D.        riFAXIMin (Xifaxan) tablet 550 mg  550 mg Oral BID Adan Charlton M.D.        lactulose 20 GM/30ML solution 30 mL  30 mL Oral TID Adan Charlton M.D.        dexmedetomidine (PRECEDEX) 400 mcg/100mL NS premix infusion  0.1-1.5 mcg/kg/hr (Ideal) Intravenous Continuous Adan Charlton M.D.        insulin regular (HumuLIN R,NovoLIN R) injection  2-9 Units Subcutaneous 4X/DAY ACHS Adan Charlton M.D.        And    dextrose 10 % BOLUS 25 g  25 g Intravenous Q15 MIN PRN Adan Charlton M.D.        Respiratory Therapy Consult   Nebulization Continuous RT Adan Charlton M.D.         Current Outpatient Medications   Medication Sig Dispense Refill    gabapentin (NEURONTIN) 300 MG Cap Take 600 mg by mouth 4 times a day.      albuterol (VENTOLIN OR PROVENTIL) 108 (90 BASE) MCG/ACT AERS Inhale 2 Puffs by mouth every 4 hours. 1 Inhaler 0    clindamycin (CLEOCIN T) 1 % GEL Apply  to affected area(s) 2 Times a Day. use thin film on affected area 1 Tube 1    clobetasol (TEMOVATE) 0.05 % OINT Apply  to affected area(s) 2 Times a Day. Apply as directed above 1 Tube 1    diphenhydramine-ZnAcetate (BENADRYL ITCH) 1-0.1 % CREA Apply  to affected area(s) 2 Times a Day. Apply as directed 1 Tube 1    pramoxine-calamine 1-8% (CALADRYL) lotion Apply 1 Inch to affected area(s) 4 times a day. 1 Bottle 1       Allergies  Allergies   Allergen Reactions    Pcn [Penicillins]        Vital Signs last 24 hours  Temp:  [36.4 °C (97.6 °F)-36.9 °C (98.4 °F)] 36.9 °C (98.4 °F)  Pulse:  [68-81] 68  Resp:  [16-24] 20  BP: (114-140)/(60-79) 137/63  SpO2:  [70 %-97 %] 92  %    Physical Exam  Physical Exam  Vitals and nursing note reviewed.   Constitutional:       General: She is not in acute distress.     Appearance: She is obese.      Comments: Chronic ill appearing no acute distress snoring respiration while in BL 13   HENT:      Mouth/Throat:      Mouth: Mucous membranes are moist.   Eyes:      Pupils: Pupils are equal, round, and reactive to light.   Cardiovascular:      Rate and Rhythm: Normal rate.      Heart sounds: No murmur heard.  Pulmonary:      Effort: No respiratory distress.      Breath sounds: No stridor. No wheezing or rhonchi.   Abdominal:      General: There is no distension.      Palpations: There is no mass.      Tenderness: There is no abdominal tenderness.      Hernia: No hernia is present.   Musculoskeletal:         General: Swelling and tenderness present.      Comments: Left leg redness and swelling   Neurological:      General: No focal deficit present.      Mental Status: She is alert and oriented to person, place, and time.      Cranial Nerves: No cranial nerve deficit.      Sensory: No sensory deficit.      Motor: No weakness.      Coordination: Coordination normal.   Psychiatric:         Mood and Affect: Mood normal.         Fluids  No intake or output data in the 24 hours ending 12/28/23 0550    Laboratory  Recent Results (from the past 48 hour(s))   CBC WITH DIFFERENTIAL    Collection Time: 12/27/23  9:30 PM   Result Value Ref Range    WBC 8.4 4.8 - 10.8 K/uL    RBC 5.00 4.20 - 5.40 M/uL    Hemoglobin 17.3 (H) 13.0 - 17.0 g/dL    Hematocrit 54.3 (H) 39.0 - 50.0 %    .6 (H) 81.0 - 99.0 fL    MCH 34.6 (H) 27.0 - 31.0 pg    MCHC 31.9 (L) 33.0 - 37.0 g/dL    RDW 15.2 (H) 11.5 - 14.5 %    Platelet Count 183 130 - 400 K/uL    MPV 10.0 7.4 - 10.4 fL    Neutrophils Automated 62.8 39.0 - 70.0 %    Neutrophils-Polys 64 39 - 70 %    Lymphocytes Automated 19.2 (L) 21.0 - 50.0 %    Lymphocytes 21.0 21.0 - 50.0 %    Monocytes Automated 16.9 (H) 2.0 - 9.0 %     Monocytes 14.0 (H) 2.0 - 9.0 %    Eosinophils Automated 0.1 0.0 - 5.0 %    Basophils Automated 0.5 0.0 - 3.0 %    Abs Neutrophils Automated 5.3 1.8 - 7.7 K/uL    Neutrophils (Absolute) 5.4 1.8 - 7.7 K/uL    Abs Lymph Automated 1.6 1.2 - 4.8 K/uL    Eosinophil Count, Blood 0.01 0.00 - 0.50 K/uL   Basic Metabolic Panel    Collection Time: 12/27/23  9:30 PM   Result Value Ref Range    Sodium 135 (L) 136 - 145 mmol/L    Potassium 5.9 (H) 3.5 - 5.1 mmol/L    Chloride 101 98 - 107 mmol/L    Co2 26 21 - 32 mmol/L    Glucose 90 74 - 99 mg/dL    Bun 58 (H) 7 - 18 mg/dL    Creatinine 2.0 (H) 0.6 - 1.0 mg/dL    Calcium 9.3 8.5 - 11.0 mg/dL    Anion Gap 14 10 - 18 mmol/L   HEPATIC FUNCTION PANEL    Collection Time: 12/27/23  9:30 PM   Result Value Ref Range    Alkaline Phosphatase 118 (H) 46 - 116 U/L    AST(SGOT) 26 15 - 37 U/L    ALT(SGPT) 13 12 - 78 U/L    Total Bilirubin 1.5 (H) 0.2 - 1.0 mg/dL    Direct Bilirubin 0.9 (H) 0.0 - 0.2 mg/dL    Indirect Bilirubin 0.6 0.1 - 0.9 mg/dL    Albumin 3.6 3.4 - 5.0 g/dL    Total Protein 7.6 6.4 - 8.2 g/dL   proBrain Natriuretic Peptide, NT    Collection Time: 12/27/23  9:30 PM   Result Value Ref Range    NT-proBNP 95432 (H) 0 - 125 pg/mL   TROPONIN    Collection Time: 12/27/23  9:30 PM   Result Value Ref Range    Troponin I 15.4 0.0 - 60.3 pg/mL   Prothrombin Time    Collection Time: 12/27/23  9:30 PM   Result Value Ref Range    PT 16.5 (H) 9.5 - 12.1 sec    INR 1.62    ESTIMATED GFR    Collection Time: 12/27/23  9:30 PM   Result Value Ref Range    GFR (CKD-EPI) 28 (A) >60 mL/min/1.73 m 2   URINALYSIS    Collection Time: 12/27/23 10:40 PM    Specimen: Urine   Result Value Ref Range    Color YELLOW     Character CLEAR     Specific Gravity >=1.030 (A) 1.003 - 1.030    Ph 5.5 5.0 - 8.0    Glucose NEGATIVE Negative mg/dL    Ketones TRACE (A) Negative mg/dL    Protein 100 (A) Negative mg/dL    Bilirubin MODERATE (A) Negative    Urobilinogen, Urine 0.2 0.2 - 1.0 mg/dL    Nitrite NEGATIVE  Negative    Leukocyte Esterase NEGATIVE Negative    Occult Blood NEGATIVE Negative   EKG    Collection Time: 23 12:32 AM   Result Value Ref Range    Report       [facility]    Test Date:  2023  Pt Name:    GASPER GODINEZ               Department: ER  MRN:        [id number]                      Room:       BL 13  Gender:     Female                       Technician: [id number]  :        1960                   Requested By:ER TRIAGE PROTOCOL  Order #:    [id number]                    Reading MD:    Measurements  Intervals                                Axis  Rate:       77                           P:          0  AZ:         0                            QRS:        78  QRSD:       88                           T:          -13  QT:         478  QTc:        542    Interpretive Statements  Atrial fibrillation  Low voltage, extremity and precordial leads  Anteroseptal infarct, old  Minimal ST depression, inferior leads  Prolonged QT interval  Baseline wander in lead(s) II,III,aVF  No previous ECG available for comparison     CBC With Differential    Collection Time: 23 12:34 AM   Result Value Ref Range    WBC 9.5 4.8 - 10.8 K/uL    RBC 4.88 4.20 - 5.40 M/uL    Hemoglobin 16.7 (H) 12.0 - 16.0 g/dL    Hematocrit 53.0 (H) 37.0 - 47.0 %    .6 (H) 81.4 - 97.8 fL    MCH 34.2 (H) 27.0 - 33.0 pg    MCHC 31.5 (L) 32.2 - 35.5 g/dL    RDW 61.7 (H) 35.9 - 50.0 fL    Platelet Count 180 164 - 446 K/uL    MPV 10.4 9.0 - 12.9 fL    Neutrophils-Polys 66.20 44.00 - 72.00 %    Lymphocytes 14.20 (L) 22.00 - 41.00 %    Monocytes 17.70 (H) 0.00 - 13.40 %    Eosinophils 0.20 0.00 - 6.90 %    Basophils 0.60 0.00 - 1.80 %    Immature Granulocytes 1.10 (H) 0.00 - 0.90 %    Nucleated RBC 0.00 0.00 - 0.20 /100 WBC    Neutrophils (Absolute) 6.26 1.82 - 7.42 K/uL    Lymphs (Absolute) 1.34 1.00 - 4.80 K/uL    Monos (Absolute) 1.67 (H) 0.00 - 0.85 K/uL    Eos (Absolute) 0.02 0.00 - 0.51 K/uL     Baso (Absolute) 0.06 0.00 - 0.12 K/uL    Immature Granulocytes (abs) 0.10 0.00 - 0.11 K/uL    NRBC (Absolute) 0.00 K/uL   Comp Metabolic Panel    Collection Time: 12/28/23 12:34 AM   Result Value Ref Range    Sodium 137 135 - 145 mmol/L    Potassium 6.1 (H) 3.6 - 5.5 mmol/L    Chloride 101 96 - 112 mmol/L    Co2 22 20 - 33 mmol/L    Anion Gap 14.0 7.0 - 16.0    Glucose 93 65 - 99 mg/dL    Bun 57 (H) 8 - 22 mg/dL    Creatinine 1.50 (H) 0.50 - 1.40 mg/dL    Calcium 9.1 8.5 - 10.5 mg/dL    Correct Calcium 9.1 8.5 - 10.5 mg/dL    AST(SGOT) 28 12 - 45 U/L    ALT(SGPT) 11 2 - 50 U/L    Alkaline Phosphatase 122 (H) 30 - 99 U/L    Total Bilirubin 1.3 0.1 - 1.5 mg/dL    Albumin 4.0 3.2 - 4.9 g/dL    Total Protein 7.2 6.0 - 8.2 g/dL    Globulin 3.2 1.9 - 3.5 g/dL    A-G Ratio 1.3 g/dL   Lactic Acid    Collection Time: 12/28/23 12:34 AM   Result Value Ref Range    Lactic Acid 1.6 0.5 - 2.0 mmol/L   Troponin    Collection Time: 12/28/23 12:34 AM   Result Value Ref Range    Troponin T 30 (H) 6 - 19 ng/L   Prothrombin Time    Collection Time: 12/28/23 12:34 AM   Result Value Ref Range    PT 18.0 (H) 12.0 - 14.6 sec    INR 1.47 (H) 0.87 - 1.13   ESTIMATED GFR    Collection Time: 12/28/23 12:34 AM   Result Value Ref Range    GFR (CKD-EPI) 39 (A) >60 mL/min/1.73 m 2   CoV-2, Flu A/B, And RSV by PCR (smsPREP)    Collection Time: 12/28/23 12:45 AM    Specimen: Respirate   Result Value Ref Range    Influenza virus A RNA Negative Negative    Influenza virus B, PCR Negative Negative    RSV, PCR Negative Negative    SARS-CoV-2 by PCR NotDetected     SARS-CoV-2 Source NP Swab    FLUID CELL COUNT    Collection Time: 12/28/23  2:00 AM   Result Value Ref Range    Fluid Type Ascites     Color-Body Fluid Yellow     Character-Body Fluid Cloudy     Total RBC Count 2000 cells/uL    FL Total Nucleated Cells 416 cells/uL    Polys 17 %    Lymphs 10 %    Fl Mono Macrophages 64 %    Fl Lining Cells 9    FLUID ALBUMIN    Collection Time: 12/28/23  2:00  AM   Result Value Ref Range    Fluid Type Ascites     Albumin 2.2 g/dL   FLUID GLUCOSE    Collection Time: 12/28/23  2:00 AM   Result Value Ref Range    Glucose, Fluid 92 mg/dL   Basic Metabolic Panel    Collection Time: 12/28/23  2:53 AM   Result Value Ref Range    Sodium 138 135 - 145 mmol/L    Potassium 7.0 (HH) 3.6 - 5.5 mmol/L    Chloride 101 96 - 112 mmol/L    Co2 23 20 - 33 mmol/L    Glucose 91 65 - 99 mg/dL    Bun 57 (H) 8 - 22 mg/dL    Creatinine 1.48 (H) 0.50 - 1.40 mg/dL    Calcium 9.1 8.5 - 10.5 mg/dL    Anion Gap 14.0 7.0 - 16.0   TSH WITH REFLEX TO FT4    Collection Time: 12/28/23  2:53 AM   Result Value Ref Range    TSH 7.170 (H) 0.380 - 5.330 uIU/mL   VENOUS BLOOD GAS    Collection Time: 12/28/23  2:53 AM   Result Value Ref Range    Venous Bg Ph 7.13 (L) 7.31 - 7.45    Venous Bg Ph Temp Corrected 7.14 (L) 7.31 - 7.45    Venous Bg Pco2 79.1 (H) 41.0 - 51.0 mmHg    Venous Bg Pco2 Temp Corrected 77.1 (H) 41.0 - 51.0 mmHg    Venous Bg Po2 48.8 (H) 25.0 - 40.0 mmHg    Venous Bg Po2 Temp Corrected 46.8 (H) 25.0 - 40.0 mmHg    Venous Bg O2 Saturation 73.2 %    Venous Bg Hco3 26 24 - 28 mmol/L    Venous Bg Base Excess -6 mmol/L    Body Temp 36.4 Centigrade    O2 Therapy 7L    FREE THYROXINE    Collection Time: 12/28/23  2:53 AM   Result Value Ref Range    Free T-4 1.05 0.93 - 1.70 ng/dL   ESTIMATED GFR    Collection Time: 12/28/23  2:53 AM   Result Value Ref Range    GFR (CKD-EPI) 40 (A) >60 mL/min/1.73 m 2   Urine Drug Screen    Collection Time: 12/28/23  2:56 AM   Result Value Ref Range    Amphetamines Urine Negative Negative    Barbiturates Negative Negative    Benzodiazepines Negative Negative    Cocaine Metabolite Negative Negative    Fentanyl, Urine Negative Negative    Methadone Negative Negative    Opiates Negative Negative    Oxycodone Negative Negative    Phencyclidine -Pcp Negative Negative    Propoxyphene Negative Negative    Cannabinoid Metab Negative Negative   Urine Creatinine Random     Collection Time: 12/28/23  2:56 AM   Result Value Ref Range    Creatinine, Random Urine 105.82 mg/dL   URINALYSIS    Collection Time: 12/28/23  2:56 AM    Specimen: Urine, Clean Catch   Result Value Ref Range    Color Yellow     Character Clear     Specific Gravity 1.014 <1.035    Ph 5.0 5.0 - 8.0    Glucose Negative Negative mg/dL    Ketones Negative Negative mg/dL    Protein 100 (A) Negative mg/dL    Bilirubin Negative Negative    Urobilinogen, Urine 0.2 Negative    Nitrite Negative Negative    Leukocyte Esterase Negative Negative    Occult Blood Small (A) Negative    Micro Urine Req Microscopic    D-DIMER    Collection Time: 12/28/23  2:56 AM   Result Value Ref Range    D-Dimer 4.02 (H) 0.00 - 0.50 ug/mL (FEU)   URINE MICROSCOPIC (W/UA)    Collection Time: 12/28/23  2:56 AM   Result Value Ref Range    WBC 0-2 /hpf    RBC 10-20 (A) /hpf    Bacteria Negative None /hpf    Epithelial Cells Negative /hpf    Hyaline Cast 3-5 (A) /lpf       Imaging  US-RENAL   Final Result      Normal exam.      DX-CHEST-LIMITED (1 VIEW)   Final Result         No pulmonary infiltrates or consolidations are noted.      Cardiomegaly.      EC-ECHOCARDIOGRAM COMPLETE W/O CONT    (Results Pending)   CT-HEAD W/O    (Results Pending)   US-EXTREMITY VENOUS LOWER BILAT    (Results Pending)       Assessment/Plan  Acute kidney injury (HCC)  Assessment & Plan  Likely cardorenal syndrome with pulmonary hypertension  Maintain euvolemia and monitor fluid responsiveness (avoid NaCL and renal congestion)  MAP > 65 uses pressors or inotropic trial  Monitor urine output and I&O's  Avoid and review nephrotoxin medication  Rule out post obstruction  Consider renal U/S if no renal images  U/a and CPK    Ddx: ATN, prerenal, AIN, JESSY, obstructive, SIRS induced, embolic, vascular, pulmonary renal syndromes, GBM  Consider: lasix stress test, stop negative inotrope (BB, CaB, Ace/ARB)      Acute on chronic respiratory failure with hypoxia (HCC)- (present on  admission)  Assessment & Plan  Hx of COPD and nicotine dependence  Likely symptoms of pulmonary hypertension causing shortness of breath  Check lower ext doppler  Start Bipap with low Epap with pulmonary hypertension  No wheezing on exam RT protocols  Goal sat 90-92% with her pulmonary hypertension    Type 2 diabetes mellitus (HCC)  Assessment & Plan  Check HgA1C  Sliding scale coverage    Pulmonary hypertension (HCC)  Assessment & Plan  Hx of bedside pocus with congestion on right side  Follow up Echo  Check lower ext doppler  Force diuresis  Likely group 3 related  Consider CTA chest if concerns of PE once renal function improves    Erythrocytosis  Assessment & Plan  Likely from chronic hypoxia  Serial monitor need for phlebotomy    Cellulitis of left lower extremity  Assessment & Plan  Left lower leg cellulitis  MRSA nares, zyvox and cefepime  Lower ext doppler rule out vte    Cirrhosis (HCC)  Assessment & Plan  Hx of prior alcohol abuse, could also be congestive vs BOLAÑOS  Avoid hepatoxins  Start rx for hepatic encephalopathy with lactulose and rifaxamin  Repeat hepatitis panel prior in 2012 normal      Encephalopathy acute  Assessment & Plan  Related to hypercarbic respiratory failure  Check ammonia Rx for hepatic encephalopathy  Avoid sedative  Non focal exam  Bipap  Aspiration precautions    Hyperkalemia  Assessment & Plan  S/p hyperkalemia rx in ER no EKG changes  Serial BMP Q4  High dose lasix  Monitor need for RRT      COPD (chronic obstructive pulmonary disease) (HCC)- (present on admission)  Assessment & Plan  Hx of continue inhalers  RT protocol not in acute excerbation    Ascites- (present on admission)  Assessment & Plan  S/p 4L paracentesis in ER  No signs to suggest SBP  Follow up on Cx        Discussed patient condition and risk of morbidity and/or mortality with Hospitalist, RN, RT, Charge nurse / hot rounds, and Patient.      The patient remains critically ill from hyperkalemia and hypoxic and  hypercarbic respiratory failure needing Bipap.  Critical care time = 82 minutes in directly providing and coordinating critical care and extensive data review.  No time overlap and excludes procedures.

## 2023-12-28 NOTE — ED TRIAGE NOTES
"Chief Complaint   Patient presents with    Shortness of Breath     BIB EMS; transfer from [facility]; SOB x 1 week; denies any chest pain, N/V; AOX4 GCS15     /75   Pulse 75   Temp 36.6 °C (97.9 °F) (Temporal)   Resp 18   Ht 1.6 m (5' 3\")   Wt 94 kg (207 lb 3.7 oz)   SpO2 93%   BMI 36.71 kg/m²     "

## 2023-12-28 NOTE — PROGRESS NOTES
2 RN Skin Assessment Completed by PRATIBHA Mcclendon and PRATIBHA Mcelroy.    Head: WDL  Ears: WDL  Nose: redness and blanching  Mouth: WDL  Neck: Redness, blanching, and scab  Breasts/Chest: redness  Shoulder Blades: redness and blanching  Spine: redness and blanching  (R) Arm/Elbow/hand: scab, redness and blanching  (L) Arm/Elbow/hand: scab, redness and blanching  Abdomen:redness, blanching, and incision from paracentesis in ED  Groin: redness  Sacrum/Coccyx/Buttocks: redness and blanching  (R) Leg: redness, blanching, swelling, and edema  (L) Leg: redness, blanching, swelling, and edema  (R) Heel/Foot/Toe: redness and blanching  (L) Heel/Foot/Toe: redness and non-blanching          Devices in place: ECG, BP Cuff, Pulse Ox, and Love Temperature    Interventions in place: Bipap Protecta-Gel, InterDry, Heel Mepilex, Sacral Mepilex, Pillows, Elbow Mepilex, Q2 turns, Low air loss mattress , Heels floated with pillows, and Pressure redistribution mattress    Possible skin injury found: No    Pictures uploaded into Epic: N/A  Wound Consult Placed: N/A

## 2023-12-28 NOTE — PROGRESS NOTES
1530  Lab notified this RN at 1436 that green top for 1400 BMP had hemolyzed. Per sample handling, will send phlebotomy to draw. Phlebotomy at bedside around 1530 to collect BMP

## 2023-12-28 NOTE — ED NOTES
ERP requests paracentesis supplies and consent for procedure. Supplies ordered and at bedside, consent obtained and placed in patient chart

## 2023-12-28 NOTE — ASSESSMENT & PLAN NOTE
Hx of prior alcohol abuse, could also be congestive vs BOLAÑOS  Avoid hepatoxins  Ammonia at 37, cont lactulose and rifaximin  Repeat hepatitis panel prior in 2012 normal  Diuresis

## 2023-12-28 NOTE — ASSESSMENT & PLAN NOTE
Per chart review has normal baseline kidney function  Patient has been taking NSAIDs prior to her presentation  Has responded to paracentesis, and initiation of loop diuretic.  Continue to follow daily BMP and urine output

## 2023-12-28 NOTE — ASSESSMENT & PLAN NOTE
Hx of bedside pocus with congestion on right side  ECHO pending  lower ext doppler: negative for DVT  Forced diuresis ongoing  Likely group 3 related  Consider CTA chest if concerns of PE once renal function improves

## 2023-12-28 NOTE — ED NOTES
Pt found w/o breathing treatment on and RA of 83%. Placed the treatment and oxygen back on the pt; satting 98% w/ breathing treatment.

## 2023-12-28 NOTE — ASSESSMENT & PLAN NOTE
Secondary to known liver cirrhosis  Status post evacuation of at least 4 L in ER of transparent  brown liquid  Now back on IV Lasix  Add Aldactone once done with acute diuresis

## 2023-12-29 LAB
ALBUMIN SERPL BCP-MCNC: 3.1 G/DL (ref 3.2–4.9)
ALBUMIN/GLOB SERPL: 1 G/DL
ALP SERPL-CCNC: 108 U/L (ref 30–99)
ALT SERPL-CCNC: 10 U/L (ref 2–50)
ANION GAP SERPL CALC-SCNC: 12 MMOL/L (ref 7–16)
AST SERPL-CCNC: 30 U/L (ref 12–45)
BASE EXCESS BLDA CALC-SCNC: 0 MMOL/L (ref -4–3)
BASE EXCESS BLDV CALC-SCNC: 3 MMOL/L (ref -4–3)
BASOPHILS # BLD AUTO: 0.4 % (ref 0–1.8)
BASOPHILS # BLD: 0.04 K/UL (ref 0–0.12)
BILIRUB SERPL-MCNC: 1 MG/DL (ref 0.1–1.5)
BODY TEMPERATURE: ABNORMAL DEGREES
BODY TEMPERATURE: ABNORMAL DEGREES
BUN SERPL-MCNC: 55 MG/DL (ref 8–22)
CALCIUM ALBUM COR SERPL-MCNC: 9.4 MG/DL (ref 8.5–10.5)
CALCIUM SERPL-MCNC: 8.7 MG/DL (ref 8.5–10.5)
CHLORIDE SERPL-SCNC: 98 MMOL/L (ref 96–112)
CO2 BLDA-SCNC: 31 MMOL/L (ref 20–33)
CO2 BLDV-SCNC: 37 MMOL/L (ref 20–33)
CO2 SERPL-SCNC: 26 MMOL/L (ref 20–33)
CREAT SERPL-MCNC: 1.39 MG/DL (ref 0.5–1.4)
DELSYS IDSYS: ABNORMAL
DELSYS IDSYS: ABNORMAL
EOSINOPHIL # BLD AUTO: 0.11 K/UL (ref 0–0.51)
EOSINOPHIL NFR BLD: 1.1 % (ref 0–6.9)
ERYTHROCYTE [DISTWIDTH] IN BLOOD BY AUTOMATED COUNT: 59.5 FL (ref 35.9–50)
GFR SERPLBLD CREATININE-BSD FMLA CKD-EPI: 43 ML/MIN/1.73 M 2
GLOBULIN SER CALC-MCNC: 3.2 G/DL (ref 1.9–3.5)
GLUCOSE BLD STRIP.AUTO-MCNC: 117 MG/DL (ref 65–99)
GLUCOSE BLD STRIP.AUTO-MCNC: 120 MG/DL (ref 65–99)
GLUCOSE BLD STRIP.AUTO-MCNC: 120 MG/DL (ref 65–99)
GLUCOSE BLD STRIP.AUTO-MCNC: 124 MG/DL (ref 65–99)
GLUCOSE BLD STRIP.AUTO-MCNC: 162 MG/DL (ref 65–99)
GLUCOSE BLD STRIP.AUTO-MCNC: 197 MG/DL (ref 65–99)
GLUCOSE BLD STRIP.AUTO-MCNC: 80 MG/DL (ref 65–99)
GLUCOSE SERPL-MCNC: 121 MG/DL (ref 65–99)
HCO3 BLDA-SCNC: 29.4 MMOL/L (ref 17–25)
HCO3 BLDV-SCNC: 34.2 MMOL/L (ref 24–28)
HCT VFR BLD AUTO: 48.5 % (ref 37–47)
HGB BLD-MCNC: 15.7 G/DL (ref 12–16)
IMM GRANULOCYTES # BLD AUTO: 0.02 K/UL (ref 0–0.11)
IMM GRANULOCYTES NFR BLD AUTO: 0.2 % (ref 0–0.9)
LACTATE BLD-SCNC: 1.2 MMOL/L (ref 0.5–2)
LPM ILPM: 5 LPM
LPM ILPM: 5 LPM
LYMPHOCYTES # BLD AUTO: 1.25 K/UL (ref 1–4.8)
LYMPHOCYTES NFR BLD: 12.9 % (ref 22–41)
MAGNESIUM SERPL-MCNC: 1.8 MG/DL (ref 1.5–2.5)
MCH RBC QN AUTO: 34.1 PG (ref 27–33)
MCHC RBC AUTO-ENTMCNC: 32.4 G/DL (ref 32.2–35.5)
MCV RBC AUTO: 105.4 FL (ref 81.4–97.8)
MONOCYTES # BLD AUTO: 1.7 K/UL (ref 0–0.85)
MONOCYTES NFR BLD AUTO: 17.6 % (ref 0–13.4)
NEUTROPHILS # BLD AUTO: 6.56 K/UL (ref 1.82–7.42)
NEUTROPHILS NFR BLD: 67.8 % (ref 44–72)
NRBC # BLD AUTO: 0 K/UL
NRBC BLD-RTO: 0 /100 WBC (ref 0–0.2)
NT-PROBNP SERPL IA-MCNC: 5820 PG/ML (ref 0–125)
PCO2 BLDA: 62.6 MMHG (ref 26–37)
PCO2 BLDV: 79.8 MMHG (ref 41–51)
PCO2 TEMP ADJ BLDA: 60.7 MMHG (ref 26–37)
PCO2 TEMP ADJ BLDV: 77.4 MMHG (ref 41–51)
PH BLDA: 7.28 [PH] (ref 7.4–7.5)
PH BLDV: 7.24 [PH] (ref 7.31–7.45)
PH TEMP ADJ BLDA: 7.29 [PH] (ref 7.4–7.5)
PH TEMP ADJ BLDV: 7.25 [PH] (ref 7.31–7.45)
PLATELET # BLD AUTO: 162 K/UL (ref 164–446)
PMV BLD AUTO: 10.4 FL (ref 9–12.9)
PO2 BLDA: 69 MMHG (ref 64–87)
PO2 BLDV: 37 MMHG (ref 25–40)
PO2 TEMP ADJ BLDA: 66 MMHG (ref 64–87)
PO2 TEMP ADJ BLDV: 35 MMHG (ref 25–40)
POTASSIUM SERPL-SCNC: 5.1 MMOL/L (ref 3.6–5.5)
PROT SERPL-MCNC: 6.3 G/DL (ref 6–8.2)
RBC # BLD AUTO: 4.6 M/UL (ref 4.2–5.4)
SAO2 % BLDA: 90 % (ref 93–99)
SAO2 % BLDV: 57 %
SODIUM SERPL-SCNC: 136 MMOL/L (ref 135–145)
SPECIMEN DRAWN FROM PATIENT: ABNORMAL
SPECIMEN DRAWN FROM PATIENT: ABNORMAL
WBC # BLD AUTO: 9.7 K/UL (ref 4.8–10.8)

## 2023-12-29 PROCEDURE — A9270 NON-COVERED ITEM OR SERVICE: HCPCS | Performed by: INTERNAL MEDICINE

## 2023-12-29 PROCEDURE — 36600 WITHDRAWAL OF ARTERIAL BLOOD: CPT

## 2023-12-29 PROCEDURE — 700105 HCHG RX REV CODE 258: Performed by: INTERNAL MEDICINE

## 2023-12-29 PROCEDURE — 82962 GLUCOSE BLOOD TEST: CPT

## 2023-12-29 PROCEDURE — 99232 SBSQ HOSP IP/OBS MODERATE 35: CPT | Performed by: NURSE PRACTITIONER

## 2023-12-29 PROCEDURE — 700111 HCHG RX REV CODE 636 W/ 250 OVERRIDE (IP): Mod: JZ | Performed by: HOSPITALIST

## 2023-12-29 PROCEDURE — 94660 CPAP INITIATION&MGMT: CPT

## 2023-12-29 PROCEDURE — 700111 HCHG RX REV CODE 636 W/ 250 OVERRIDE (IP): Performed by: INTERNAL MEDICINE

## 2023-12-29 PROCEDURE — 83735 ASSAY OF MAGNESIUM: CPT

## 2023-12-29 PROCEDURE — 80053 COMPREHEN METABOLIC PANEL: CPT

## 2023-12-29 PROCEDURE — 82803 BLOOD GASES ANY COMBINATION: CPT | Mod: 91

## 2023-12-29 PROCEDURE — 99233 SBSQ HOSP IP/OBS HIGH 50: CPT | Performed by: INTERNAL MEDICINE

## 2023-12-29 PROCEDURE — 770001 HCHG ROOM/CARE - MED/SURG/GYN PRIV*

## 2023-12-29 PROCEDURE — 0W9G3ZZ DRAINAGE OF PERITONEAL CAVITY, PERCUTANEOUS APPROACH: ICD-10-PCS | Performed by: STUDENT IN AN ORGANIZED HEALTH CARE EDUCATION/TRAINING PROGRAM

## 2023-12-29 PROCEDURE — 700102 HCHG RX REV CODE 250 W/ 637 OVERRIDE(OP): Performed by: INTERNAL MEDICINE

## 2023-12-29 PROCEDURE — 83605 ASSAY OF LACTIC ACID: CPT

## 2023-12-29 PROCEDURE — 83880 ASSAY OF NATRIURETIC PEPTIDE: CPT

## 2023-12-29 PROCEDURE — 94640 AIRWAY INHALATION TREATMENT: CPT

## 2023-12-29 PROCEDURE — 770020 HCHG ROOM/CARE - TELE (206)

## 2023-12-29 PROCEDURE — 700111 HCHG RX REV CODE 636 W/ 250 OVERRIDE (IP): Mod: JZ | Performed by: INTERNAL MEDICINE

## 2023-12-29 PROCEDURE — 85025 COMPLETE CBC W/AUTO DIFF WBC: CPT

## 2023-12-29 PROCEDURE — 99233 SBSQ HOSP IP/OBS HIGH 50: CPT | Performed by: HOSPITALIST

## 2023-12-29 RX ORDER — ENOXAPARIN SODIUM 100 MG/ML
40 INJECTION SUBCUTANEOUS DAILY
Status: DISCONTINUED | OUTPATIENT
Start: 2023-12-29 | End: 2024-01-03 | Stop reason: HOSPADM

## 2023-12-29 RX ORDER — LINEZOLID 2 MG/ML
600 INJECTION, SOLUTION INTRAVENOUS EVERY 12 HOURS
Status: CANCELLED | OUTPATIENT
Start: 2023-12-29 | End: 2024-01-02

## 2023-12-29 RX ORDER — MAGNESIUM SULFATE HEPTAHYDRATE 40 MG/ML
2 INJECTION, SOLUTION INTRAVENOUS ONCE
Status: COMPLETED | OUTPATIENT
Start: 2023-12-29 | End: 2023-12-29

## 2023-12-29 RX ORDER — LACTULOSE 20 G/30ML
45 SOLUTION ORAL 3 TIMES DAILY
Status: DISCONTINUED | OUTPATIENT
Start: 2023-12-29 | End: 2024-01-03 | Stop reason: HOSPADM

## 2023-12-29 RX ORDER — MIDODRINE HYDROCHLORIDE 5 MG/1
10 TABLET ORAL EVERY 8 HOURS
Status: DISCONTINUED | OUTPATIENT
Start: 2023-12-29 | End: 2024-01-02

## 2023-12-29 RX ORDER — ACETAZOLAMIDE 500 MG/5ML
500 INJECTION, POWDER, LYOPHILIZED, FOR SOLUTION INTRAVENOUS ONCE
Status: COMPLETED | OUTPATIENT
Start: 2023-12-29 | End: 2023-12-29

## 2023-12-29 RX ADMIN — FUROSEMIDE 80 MG: 10 INJECTION, SOLUTION INTRAMUSCULAR; INTRAVENOUS at 21:08

## 2023-12-29 RX ADMIN — RIFAXIMIN 550 MG: 550 TABLET ORAL at 17:41

## 2023-12-29 RX ADMIN — ALBUTEROL SULFATE 2 PUFF: 90 AEROSOL, METERED RESPIRATORY (INHALATION) at 02:24

## 2023-12-29 RX ADMIN — FUROSEMIDE 80 MG: 10 INJECTION, SOLUTION INTRAMUSCULAR; INTRAVENOUS at 05:33

## 2023-12-29 RX ADMIN — MIDODRINE HYDROCHLORIDE 10 MG: 5 TABLET ORAL at 13:49

## 2023-12-29 RX ADMIN — LACTULOSE 30 ML: 20 SOLUTION ORAL at 05:53

## 2023-12-29 RX ADMIN — ALBUTEROL SULFATE 2 PUFF: 90 AEROSOL, METERED RESPIRATORY (INHALATION) at 05:52

## 2023-12-29 RX ADMIN — ALBUTEROL SULFATE 2 PUFF: 90 AEROSOL, METERED RESPIRATORY (INHALATION) at 17:42

## 2023-12-29 RX ADMIN — HEPARIN SODIUM 5000 UNITS: 5000 INJECTION, SOLUTION INTRAVENOUS; SUBCUTANEOUS at 05:35

## 2023-12-29 RX ADMIN — ALBUTEROL SULFATE 2 PUFF: 90 AEROSOL, METERED RESPIRATORY (INHALATION) at 22:48

## 2023-12-29 RX ADMIN — HEPARIN SODIUM 5000 UNITS: 5000 INJECTION, SOLUTION INTRAVENOUS; SUBCUTANEOUS at 13:49

## 2023-12-29 RX ADMIN — ENOXAPARIN SODIUM 40 MG: 100 INJECTION SUBCUTANEOUS at 21:07

## 2023-12-29 RX ADMIN — ALBUTEROL SULFATE 2 PUFF: 90 AEROSOL, METERED RESPIRATORY (INHALATION) at 09:53

## 2023-12-29 RX ADMIN — FUROSEMIDE 80 MG: 10 INJECTION, SOLUTION INTRAMUSCULAR; INTRAVENOUS at 13:49

## 2023-12-29 RX ADMIN — MIDODRINE HYDROCHLORIDE 10 MG: 5 TABLET ORAL at 21:08

## 2023-12-29 RX ADMIN — RIFAXIMIN 550 MG: 550 TABLET ORAL at 05:53

## 2023-12-29 RX ADMIN — MIDODRINE HYDROCHLORIDE 5 MG: 5 TABLET ORAL at 05:53

## 2023-12-29 RX ADMIN — CEFAZOLIN 2 G: 2 INJECTION, POWDER, FOR SOLUTION INTRAMUSCULAR; INTRAVENOUS at 21:15

## 2023-12-29 RX ADMIN — ACETAZOLAMIDE 500 MG: 500 INJECTION, POWDER, LYOPHILIZED, FOR SOLUTION INTRAVENOUS at 10:26

## 2023-12-29 RX ADMIN — CEFAZOLIN 2 G: 2 INJECTION, POWDER, FOR SOLUTION INTRAMUSCULAR; INTRAVENOUS at 13:47

## 2023-12-29 RX ADMIN — MAGNESIUM SULFATE HEPTAHYDRATE 2 G: 2 INJECTION, SOLUTION INTRAVENOUS at 10:28

## 2023-12-29 RX ADMIN — LINEZOLID 600 MG: 600 INJECTION, SOLUTION INTRAVENOUS at 05:30

## 2023-12-29 RX ADMIN — CEFEPIME 2 G: 2 INJECTION, POWDER, FOR SOLUTION INTRAVENOUS at 05:59

## 2023-12-29 RX ADMIN — ALBUTEROL SULFATE 2 PUFF: 90 AEROSOL, METERED RESPIRATORY (INHALATION) at 13:42

## 2023-12-29 ASSESSMENT — COGNITIVE AND FUNCTIONAL STATUS - GENERAL
STANDING UP FROM CHAIR USING ARMS: A LITTLE
HELP NEEDED FOR BATHING: A LITTLE
PERSONAL GROOMING: A LITTLE
MOVING FROM LYING ON BACK TO SITTING ON SIDE OF FLAT BED: A LITTLE
SUGGESTED CMS G CODE MODIFIER DAILY ACTIVITY: CK
MOVING TO AND FROM BED TO CHAIR: A LITTLE
CLIMB 3 TO 5 STEPS WITH RAILING: A LITTLE
TURNING FROM BACK TO SIDE WHILE IN FLAT BAD: A LITTLE
WALKING IN HOSPITAL ROOM: A LITTLE
DRESSING REGULAR LOWER BODY CLOTHING: A LITTLE
DRESSING REGULAR UPPER BODY CLOTHING: A LITTLE
EATING MEALS: A LITTLE
DAILY ACTIVITIY SCORE: 18
TOILETING: A LITTLE
SUGGESTED CMS G CODE MODIFIER MOBILITY: CK
MOBILITY SCORE: 18

## 2023-12-29 ASSESSMENT — ENCOUNTER SYMPTOMS
SPEECH CHANGE: 0
CHILLS: 0
PALPITATIONS: 0
INSOMNIA: 0
CONSTIPATION: 0
FLANK PAIN: 0
HEADACHES: 0
VOMITING: 0
ABDOMINAL PAIN: 0
WEAKNESS: 1
DEPRESSION: 0
NECK PAIN: 0
DOUBLE VISION: 0
SHORTNESS OF BREATH: 1
DIZZINESS: 0
NERVOUS/ANXIOUS: 0
ROS GI COMMENTS: ABDOMINAL SWELLING
COUGH: 1
DIARRHEA: 0
NAUSEA: 0
FEVER: 0
BLURRED VISION: 0
SENSORY CHANGE: 0
ORTHOPNEA: 1
SORE THROAT: 0
BLOOD IN STOOL: 0
SPUTUM PRODUCTION: 0
COUGH: 0
SHORTNESS OF BREATH: 0
EYE DISCHARGE: 0
BACK PAIN: 0
MYALGIAS: 1
FALLS: 0
BRUISES/BLEEDS EASILY: 0
EYE REDNESS: 0
LOSS OF CONSCIOUSNESS: 0

## 2023-12-29 ASSESSMENT — FIBROSIS 4 INDEX
FIB4 SCORE: 3.69
FIB4 SCORE: 3.69

## 2023-12-29 ASSESSMENT — PAIN DESCRIPTION - PAIN TYPE
TYPE: ACUTE PAIN

## 2023-12-29 ASSESSMENT — PULMONARY FUNCTION TESTS: EPAP_CMH2O: 6

## 2023-12-29 ASSESSMENT — LIFESTYLE VARIABLES: SUBSTANCE_ABUSE: 0

## 2023-12-29 NOTE — PROGRESS NOTES
Critical Care Progress Note    Date of admission  12/28/2023    Chief Complaint  63 y.o. female admitted 12/28/2023 with acute hypoxic and hypercapnic respiratory failure requiring BiPAP    Hospital Course  Ms. Bernard is a 63 year old lady with the past medical history significant for pulmonary hypertension, cirrhosis due to alcohol abuse (quit 10 years ago) with ascites, COPD, type-II diabetes, tobacco abuse, and an ascending aortic aneurysm who went to [facility] on 12/27 with complaints of shortness of breath and leg swelling for 2 weeks. She was found to be fluid overload with an elevated creatinine and potassium with an oxygen saturation of 70%.  She was started on supplemental oxygen and transferred to [facility] ER for subspecialty care.  The patient underwent a 4 liter paracentesis and was given lasix.  She started to become sleepy and a VBG revealed a pH of 7.13 with a pCO2 of 79.  She was started on BiPAP and then admitted to the ICU for ongoing care.    Interval Problem Update  Reviewed last 24 hour events:   - pt wanted BiPAP off at 1am, now on 4 lpm NC   - a/ox4   - SR/SB   - SBP 90-110s   - two large BMs this am   - UOP of 1400cc overnight, Love   - up to chair   - off BiPAP since 1am   - O2 at 4 lpm Nc   - heparin   - linezolid/cefepime-->Ancef   - all cultures negative   - MRSA negative   - Hb 15/7   - platelets 162   - K 5.1   - creat 1.39   - Mg 1.8     Yesterday's Events:   - admitted just this morning   - A/ox4   - SR 60-70s   - -110s   - Tmax 97.3   - low salt diet   - UOP of 500cc since admit, Love   - BM pta   - AVAPs: IPAP 14, EPAP 6, 45%   - Precedex 0.2   - heparin   - cefepime/linezold   - K now 5.2   - Creat 1.49    Review of Systems  Review of Systems   Constitutional:  Positive for malaise/fatigue. Negative for chills and fever.   HENT:  Negative for congestion and sore throat.    Eyes:  Negative for discharge and redness.   Respiratory:  Positive for shortness of  breath. Negative for cough and sputum production.    Cardiovascular:  Positive for orthopnea and leg swelling. Negative for chest pain and palpitations.   Gastrointestinal:  Negative for abdominal pain, nausea and vomiting.        Abdominal swelling    Genitourinary:  Negative for flank pain and hematuria.   Musculoskeletal:  Negative for back pain and neck pain.   Skin:  Negative for rash.   Neurological:  Positive for weakness. Negative for dizziness, sensory change, speech change and headaches.   Endo/Heme/Allergies:  Does not bruise/bleed easily.   Psychiatric/Behavioral:  Negative for depression and substance abuse. The patient is not nervous/anxious.         Vital Signs for last 24 hours   Temp:  [35.4 °C (95.7 °F)-36.4 °C (97.5 °F)] 36.4 °C (97.5 °F)  Pulse:  [52-81] 58  Resp:  [18-67] 21  BP: ()/(50-60) 102/55  SpO2:  [87 %-99 %] 92 %    Hemodynamic parameters for last 24 hours       Respiratory Information for the last 24 hours       Physical Exam   Physical Exam  Vitals and nursing note reviewed.   Constitutional:       General: She is not in acute distress.     Appearance: She is obese. She is ill-appearing. She is not toxic-appearing.      Comments: Sleepy but wakes easily, pt appears much older than stated age and chronically ill   HENT:      Head: Normocephalic and atraumatic.      Right Ear: External ear normal.      Left Ear: External ear normal.      Nose: Nose normal.      Mouth/Throat:      Mouth: Mucous membranes are moist.      Pharynx: Oropharynx is clear. No oropharyngeal exudate.   Eyes:      General: No scleral icterus.     Conjunctiva/sclera: Conjunctivae normal.      Pupils: Pupils are equal, round, and reactive to light.   Cardiovascular:      Rate and Rhythm: Normal rate and regular rhythm.      Heart sounds: Normal heart sounds. No murmur heard.  Pulmonary:      Effort: No respiratory distress.      Breath sounds: No wheezing.      Comments: Breathing comfortably on BiPAP,  diminished  Chest:      Chest wall: No tenderness.   Abdominal:      General: There is distension.      Palpations: Abdomen is soft.      Tenderness: There is abdominal tenderness. There is no guarding or rebound.      Comments: Tender lower quadrants-->improved after BM   Genitourinary:     Comments: Love in place  Musculoskeletal:         General: Normal range of motion.      Cervical back: Normal range of motion and neck supple.      Right lower leg: Edema present.      Left lower leg: Edema present.   Lymphadenopathy:      Cervical: No cervical adenopathy.   Skin:     General: Skin is warm and dry.      Capillary Refill: Capillary refill takes 2 to 3 seconds.      Findings: No rash.      Comments: Lower extremities with erythema bilaterally   Neurological:      Mental Status: She is alert and oriented to person, place, and time.      Cranial Nerves: No cranial nerve deficit.      Sensory: No sensory deficit.      Motor: No weakness.   Psychiatric:         Mood and Affect: Mood normal.         Behavior: Behavior normal.         Thought Content: Thought content normal.         Medications  Current Facility-Administered Medications   Medication Dose Route Frequency Provider Last Rate Last Admin    senna-docusate (Pericolace Or Senokot S) 8.6-50 MG per tablet 2 Tablet  2 Tablet Oral BID Zan Barth M.D.   2 Tablet at 12/28/23 1745    And    polyethylene glycol/lytes (Miralax) Packet 1 Packet  1 Packet Oral QDAY PRN Zan Barth M.D.        And    magnesium hydroxide (Milk Of Magnesia) suspension 30 mL  30 mL Oral QDAY PRN Zan Barth M.D.        And    bisacodyl (Dulcolax) suppository 10 mg  10 mg Rectal QDAY PRN Zan Barth M.D.        heparin injection 5,000 Units  5,000 Units Subcutaneous Q8HRS Zan Barth M.D.   5,000 Units at 12/29/23 0535    acetaminophen (Tylenol) tablet 650 mg  650 mg Oral Q6HRS PRN Zan Barth M.D.        Pharmacy Consult Request ...Pain Management Review  1 Each  1 Each Other PHARMACY TO DOSE Zan Barth M.D.        labetalol (Normodyne/Trandate) injection 10 mg  10 mg Intravenous Q4HRS PRN Zan Barth M.D.        ondansetron (Zofran) syringe/vial injection 4 mg  4 mg Intravenous Q4HRS PRN Zan Barth M.D.        ondansetron (Zofran ODT) dispertab 4 mg  4 mg Oral Q4HRS PRN Zan Barth M.D.        promethazine (Phenergan) tablet 12.5-25 mg  12.5-25 mg Oral Q4HRS PRN Zan Barth M.D.        promethazine (Phenergan) suppository 12.5-25 mg  12.5-25 mg Rectal Q4HRS PRN Zan Batrh M.D.        prochlorperazine (Compazine) injection 5-10 mg  5-10 mg Intravenous Q4HRS PRN Zan Barth M.D.        albuterol inhaler 2 Puff  2 Puff Inhalation Q4HRS Zan Barth M.D.   2 Puff at 12/29/23 0552    Pharmacy Consult Request - to monitor for nephrotoxic agents  1 Each Other PHARMACY TO DOSE Zan Barth M.D.        furosemide (Lasix) injection 80 mg  80 mg Intravenous Q8HRS Adan Charlton M.D.   80 mg at 12/29/23 0533    Linezolid (Zyvox) premix 600 mg  600 mg Intravenous Q12HRS Adan Charlton M.D. 300 mL/hr at 12/29/23 0530 600 mg at 12/29/23 0530    cefepime (Maxipime) 2 g in  mL IVPB  2 g Intravenous Q24HRS Adan Charlton M.D.   Stopped at 12/29/23 0629    riFAXIMin (Xifaxan) tablet 550 mg  550 mg Oral BID Adan Charlton M.D.   550 mg at 12/29/23 0553    lactulose 20 GM/30ML solution 30 mL  30 mL Oral TID Adan Charlton M.D.   30 mL at 12/29/23 0553    dexmedetomidine (PRECEDEX) 400 mcg/100mL NS premix infusion  0.1-1.5 mcg/kg/hr (Ideal) Intravenous Continuous Adan Charlton M.D.   Stopped at 12/28/23 2234    insulin regular (HumuLIN R,NovoLIN R) injection  2-9 Units Subcutaneous 4X/DAY ACHS Adan Charlton M.D.        And    dextrose 10 % BOLUS 25 g  25 g Intravenous Q15 MIN PRN Adan Charlton M.D.        Respiratory Therapy Consult   Nebulization Continuous RT Adan Charlton M.D.        midodrine  (Proamatine) tablet 5 mg  5 mg Oral Q8HRS Pilar Borges M.D.   5 mg at 12/29/23 0553    albuterol (Proventil) 2.5mg/0.5ml nebulizer solution 2.5 mg  2.5 mg Nebulization Q2HRS PRN (RT) Pilar Borges M.D.           Fluids    Intake/Output Summary (Last 24 hours) at 12/29/2023 0643  Last data filed at 12/29/2023 0600  Gross per 24 hour   Intake 2035.65 ml   Output 3175 ml   Net -1139.35 ml       Laboratory  Recent Labs     12/28/23  0253 12/29/23  0337 12/29/23  0504   [id number] 7L  --   --    ISTATAPH  --   --  7.280*   ISTATAPCO2  --   --  62.6*   ISTATAPO2  --   --  69   ISTATATCO2  --   --  31   [id number]  --   --  90*   ISTATARTHCO3  --   --  29.4*   ISTATARTBE  --   --  0   ISTATTEMP  --  36.3 C 36.3 C   ISTATSPEC  --  Venous Arterial   ISTATAPHTC  --   --  7.290*   [id number]  --   --  66       Recent Labs     12/27/23 2130 12/28/23  0522   CPKTOTAL  --  37   TROPONINI 15.4  --        Recent Labs     12/28/23  1538 12/28/23 2020 12/29/23  0148   SODIUM 137 138 136   POTASSIUM 5.8* 5.3 5.1   CHLORIDE 102 101 98   CO2 20 25 26   BUN 56* 51* 55*   CREATININE 1.39 1.39 1.39   MAGNESIUM  --   --  1.8   CALCIUM 9.0 8.6 8.7       Recent Labs     12/27/23  2130 12/28/23  0034 12/28/23  0253 12/28/23  1538 12/28/23 2020 12/29/23  0148   ALTSGPT 13 11  --   --   --  10   ASTSGOT 26 28  --   --   --  30   ALKPHOSPHAT 118* 122*  --   --   --  108*   TBILIRUBIN 1.5* 1.3  --   --   --  1.0   DBILIRUBIN 0.9*  --   --   --   --   --    GLUCOSE 90 93   < > 92 115* 121*    < > = values in this interval not displayed.       Recent Labs     12/27/23 2130 12/28/23 0034 12/29/23 0148   WBC 8.4 9.5 9.7   NEUTSPOLYS 64 66.20 67.80   LYMPHOCYTES 21.0 14.20* 12.90*   MONOCYTES 14.0* 17.70* 17.60*   EOSINOPHILS  --  0.20 1.10   BASOPHILS  --  0.60 0.40   ASTSGOT 26 28 30   ALTSGPT 13 11 10   ALKPHOSPHAT 118* 122* 108*   TBILIRUBIN 1.5* 1.3 1.0       Recent Labs     12/27/23 2130 12/28/23 0034 12/29/23 0148   RBC 5.00  4.88 4.60   HEMOGLOBIN 17.3* 16.7* 15.7   HEMATOCRIT 54.3* 53.0* 48.5*   PLATELETCT 183 180 162*   PROTHROMBTM 16.5* 18.0*  --    INR 1.62 1.47*  --          Imaging  Bilateral LE DVT study:   FINDINGS:   Bilateral lower extremities.    The peroneal and posterior tibial veins are difficult to assess for    compressibility, but flow response to augmentation is demonstrated.    All other veins demonstrate complete color filling and compressibility with    normal venous flow dynamics including spontaneous flow and respiratory    phasicity.    No deep venous thrombosis.    Edema reduces the image quality.    Interstitial fluid consistent with edema is observed in the thigh and below    the knee.    Renal US: negative    Assessment/Plan  Type 2 diabetes mellitus (HCC)  Assessment & Plan  BG goals 140-180s  Medium ISS  A1c of 6.4      Pulmonary hypertension (HCC)  Assessment & Plan  Hx of bedside pocus with congestion on right side  ECHO pending  lower ext doppler: negative for DVT  Forced diuresis ongoing  Likely group 3 related  Consider CTA chest if concerns of PE once renal function improves    Erythrocytosis  Assessment & Plan  Likely from chronic hypoxia  Serial monitor need for phlebotomy.    Cellulitis of left lower extremity  Assessment & Plan  Left lower leg cellulitis  MRSA nares: negative  Lower ext doppler: negative for DVT  Course of Ancef    Cirrhosis (HCC)  Assessment & Plan  Hx of prior alcohol abuse, could also be congestive vs BOLAÑOS  Avoid hepatoxins  Ammonia at 37, cont lactulose and rifaximin  Repeat hepatitis panel prior in 2012 normal  Diuresis       Acute on chronic respiratory failure with hypoxia and hypercapnia (HCC)- (present on admission)  Assessment & Plan  Hx of COPD   Likely symptoms of pulmonary hypertension causing shortness of breath  Check lower ext doppler: negative for DVT  BiPAP/CPAP at night  No wheezing on exam RT protocols  Goal sat 90-92% with her pulmonary hypertension  Continue  aggressive diuresis, diamox 500mg IV x 1    Encephalopathy acute  Assessment & Plan  Related to hypercarbic respiratory failure  Ammonia at 37  Avoid sedatives  Non focal exam  Improving     Hyperkalemia  Assessment & Plan  S/p hyperkalemia rx in ER no EKG changes  Serial BMP Q4  High dose lasix  Resolving       Acute kidney injury (HCC)  Assessment & Plan  Concerning for cardiorenal syndrome with pulmonary hypertension vs hepatorenal  Maintain euvolemia and monitor fluid responsiveness (avoid NaCL and renal congestion)  MAP > 65: cont midodrine at 10mg every 8 hours  Monitor urine output and I&O's  Avoid and review nephrotoxin medication  Good UOP with mcleod  Renal US without hydro  Marked improvement        COPD (chronic obstructive pulmonary disease) (HCC)- (present on admission)  Assessment & Plan  Hx of continue inhalers  RT protocol not in acute excerbation    Ascites- (present on admission)  Assessment & Plan  S/p 4L paracentesis in ER  No signs to suggest SBP  Cont aggressive diuresis with Lasix 80mg every 8 hours  Assess for needs for repeat paracentesis         VTE:  Heparin  Ulcer: Not Indicated  Lines: Mcleod Catheter  Ongoing indication addressed and PIVs    I have performed a physical exam and reviewed and updated ROS and Plan today (12/29/2023). In review of yesterday's note (12/28/2023), there are no changes except as documented above.     Discussed patient condition and risk of morbidity and/or mortality with Hospitalist, RN, RT, Pharmacy, [employer] [facility] resident, Charge nurse / hot rounds, and Patient    The patient has weaned successfully off of BiPAP and has been diuresing quite well with improved renal function.  She no longer requires the ICU at this time.  She will need nocturnal BiPAP or CPAP as I do feel she has underlying LAURI that is not diagnosed.  For now the patient can be transferred to the telemetry floor.  The case was discussed with the hospitalist team who will take over care at this time.   The critical care team will sign off at this point.

## 2023-12-29 NOTE — HOSPITAL COURSE
Ms. Bernadr is a 63 year old lady with the past medical history significant for pulmonary hypertension, cirrhosis due to alcohol abuse (quit 10 years ago) with ascites, COPD, type-II diabetes, tobacco abuse, and an ascending aortic aneurysm who went to [facility] on 12/27 with complaints of shortness of breath and leg swelling for 2 weeks. She was found to be fluid overload with an elevated creatinine and potassium with an oxygen saturation of 70%. She was started on supplemental oxygen and transferred to [facility] ER for subspecialty care. The patient underwent a 4 liter paracentesis and was given lasix. She started to become sleepy and a VBG revealed a pH of 7.13 with a pCO2 of 79. She was started on BiPAP and then admitted to the ICU for ongoing care.

## 2023-12-29 NOTE — CARE PLAN
The patient is Watcher - Medium risk of patient condition declining or worsening    Shift Goals  Clinical Goals: monitor labs, O2 stability  Patient Goals: eat, sleep  Family Goals: gisell    Progress made toward(s) clinical / shift goals:      Problem: Fluid Volume  Goal: Fluid volume balance will be maintained  Outcome: Progressing     Problem: Bowel Elimination  Goal: Establish and maintain regular bowel function  Outcome: Progressing     Problem: Pain - Standard  Goal: Alleviation of pain or a reduction in pain to the patient’s comfort goal  Outcome: Progressing

## 2023-12-29 NOTE — PROGRESS NOTES
[facility] Medicine Daily Progress Note    Date of Service  12/29/2023    Chief Complaint  Yas Bernard is a 63 y.o. female admitted 12/28/2023 with altered    Hospital Course  62yo PMHx cirrhosis, COPD, PAH, DM, ascending aortic aneurysm, tobacco use, distant Hx of ETOH but now sober presenting with weakness.  In ED found to be hypoxic, hyperkalemic and in TARUN.  Paracentesis of 4 L done.  Transferred to ICU on 12/28 with hypercarbic resp failure on BiPAP    Interval Problem Update  Feels better.  Belly no longer hurting.  Less SOB    I have discussed this patient's plan of care and discharge plan at IDT rounds today with Case Management, Nursing, Nursing leadership, and other members of the IDT team.    Consultants/Specialty      Code Status  Full Code    Disposition  The patient is not medically cleared for discharge to home or a post-acute facility.      I have placed the appropriate orders for post-discharge needs.    Review of Systems  Review of Systems   Constitutional:  Positive for malaise/fatigue. Negative for chills and fever.   HENT:  Negative for nosebleeds and sore throat.    Eyes:  Negative for blurred vision and double vision.   Respiratory:  Positive for cough and shortness of breath.    Cardiovascular:  Negative for chest pain, palpitations and leg swelling.   Gastrointestinal:  Negative for abdominal pain, diarrhea, nausea and vomiting.   Genitourinary:  Negative for dysuria and urgency.   Musculoskeletal:  Negative for back pain.   Skin:  Negative for rash.   Neurological:  Negative for dizziness, loss of consciousness and headaches.        Physical Exam  Temp:  [36 °C (96.8 °F)-36.5 °C (97.7 °F)] 36.4 °C (97.5 °F)  Pulse:  [52-66] 62  Resp:  [18-46] 21  BP: ()/(50-60) 105/60  SpO2:  [87 %-97 %] 91 %    Physical Exam  Constitutional:       General: She is not in acute distress.     Appearance: Normal appearance. She is well-developed. She is not diaphoretic.   HENT:      Head: Normocephalic  and atraumatic.   Neck:      Vascular: No JVD.   Cardiovascular:      Rate and Rhythm: Normal rate and regular rhythm.      Heart sounds: No murmur heard.  Pulmonary:      Effort: Pulmonary effort is normal. No respiratory distress.      Breath sounds: No stridor. No wheezing or rales.   Abdominal:      General: There is distension.      Palpations: Abdomen is soft.      Tenderness: There is no abdominal tenderness. There is no guarding or rebound.      Comments: =FLUID WAVE   Musculoskeletal:         General: No tenderness.      Right lower leg: No edema.      Left lower leg: No edema.   Skin:     General: Skin is warm and dry.      Capillary Refill: Capillary refill takes less than 2 seconds.      Findings: No rash.      Comments: B LOWER LEG ERYTHEMA   Neurological:      Mental Status: She is oriented to person, place, and time.   Psychiatric:         Mood and Affect: Mood normal.         Behavior: Behavior normal.         Thought Content: Thought content normal.         Fluids    Intake/Output Summary (Last 24 hours) at 12/29/2023 1151  Last data filed at 12/29/2023 1000  Gross per 24 hour   Intake 1895.32 ml   Output 3180 ml   Net -1284.68 ml       Laboratory  Recent Labs     12/27/23 2130 12/28/23  0034 12/29/23  0148   WBC 8.4 9.5 9.7   RBC 5.00 4.88 4.60   HEMOGLOBIN 17.3* 16.7* 15.7   HEMATOCRIT 54.3* 53.0* 48.5*   .6* 108.6* 105.4*   MCH 34.6* 34.2* 34.1*   MCHC 31.9* 31.5* 32.4   RDW 15.2* 61.7* 59.5*   PLATELETCT 183 180 162*   MPV 10.0 10.4 10.4     Recent Labs     12/28/23  1538 12/28/23  2020 12/29/23  0148   SODIUM 137 138 136   POTASSIUM 5.8* 5.3 5.1   CHLORIDE 102 101 98   CO2 20 25 26   GLUCOSE 92 115* 121*   BUN 56* 51* 55*   CREATININE 1.39 1.39 1.39   CALCIUM 9.0 8.6 8.7     Recent Labs     12/27/23 2130 12/28/23  0034   INR 1.62 1.47*               Imaging  US-EXTREMITY VENOUS LOWER BILAT   Final Result      CT-HEAD W/O   Final Result      No acute process.         US-RENAL   Final  Result      Normal exam.      DX-CHEST-LIMITED (1 VIEW)   Final Result         No pulmonary infiltrates or consolidations are noted.      Cardiomegaly.      EC-ECHOCARDIOGRAM COMPLETE W/O CONT    (Results Pending)        Assessment/Plan  Type 2 diabetes mellitus (HCC)  Assessment & Plan  A1c 6.4  Given ESLD and concern for nutritional status would prefer not to restrict her diet    Cellulitis of left lower extremity  Assessment & Plan  Blood cultures neg  cefazolin    Acute on chronic respiratory failure with hypoxia and hypercapnia (HCC)- (present on admission)  Assessment & Plan  History of COPD though not on home oxygen.  She was told she needed to be on it, and in fact had it at home, but insurance stopped covering it and she did not have the money to pay for it herself.  Continue diuresis: Currently on Lasix 80 mg 3 times daily.  Follow daily BMP  O2 and RT protocols  Mobilize  Encourage smoking cessation    Encephalopathy acute  Assessment & Plan  Acute HE now resolved  Lactulose  rifaximin    Hyperkalemia  Assessment & Plan  resolved    Acute kidney injury (HCC)  Assessment & Plan  Per chart review has normal baseline kidney function  Patient has been taking NSAIDs prior to her presentation  Has responded to paracentesis, and initiation of loop diuretic.  Continue to follow daily BMP and urine output      Elevated troponin  Assessment & Plan  Clinically looks like demand ischemia  Trop marginally elevated   TTE pending  EKG reassuring  Cont Tele  No CP or apparent CP equivalent    Acute respiratory failure with hypoxia  Assessment & Plan  At this time she requires 5 to 8 L oxygen mask.  History of COPD, currently not in exacerbation  Etiology may include hepatopulmonary syndrome, congestive heart failure, pneumonia  Chest x-ray showed hazy bilateral opacities  BNP is elevated at 13,000  Plan to continue forced diuresis IV Lasix 60 mg twice daily  Obtain transthoracic echo, D-dimer  Monitor daily weight, input  and output      COPD (chronic obstructive pulmonary disease) (HCC)- (present on admission)  Assessment & Plan  Not in exacerbation  Albuterol as needed  Supplemental oxygen    Ascites- (present on admission)  Assessment & Plan  Secondary to known liver cirrhosis  Status post evacuation of at least 4 L in ER of transparent  brown liquid  Now back on IV Lasix  Add Aldactone once done with acute diuresis           VTE prophylaxis:    enoxaparin ppx      I have performed a physical exam and reviewed and updated ROS and Plan today (12/29/2023). In review of yesterday's note (12/28/2023), there are no changes except as documented above.

## 2023-12-29 NOTE — PROGRESS NOTES
Gastroenterology Progress Note               Author:  GUILLERMINA Barajas Date & Time Created: 12/29/2023 11:56 AM       Patient ID:  Name:             Yas Bernard    YOB: 1960  Age:                 63 y.o.  female  MRN:               [id number]        Medical Decision Making, by Problem:  Active Hospital Problems    Diagnosis     Acute respiratory failure with hypoxia [J96.90]     Elevated troponin [R79.89]     Acute kidney injury (HCC) [N17.9]     Hyperkalemia [E87.5]     Encephalopathy acute [G93.40]     Acute on chronic respiratory failure with hypoxia and hypercapnia (HCC) [J96.21, J96.22]     Cirrhosis (HCC) [K74.60]     Cellulitis of left lower extremity [L03.116]     Erythrocytosis [D75.1]     Pulmonary hypertension (HCC) [I27.20]     Type 2 diabetes mellitus (HCC) [E11.9]     COPD (chronic obstructive pulmonary disease) (HCC) [J44.9]     Ascites [R18.8]            Presenting Chief Complaint:  Liver cirrhosis.     History of Present Illness:   The patient is a 63 years old female with medical history of cirrhosis possibly from fatty liver and alcohol use.  The patient presented to outside hospital for fatigue, shortness of breath for about 1 to 2 weeks.  The patient also feel distended in the abdomen.     At outside hospital, liver function suboptimal but relative stable however acute kidney injury with electrolyte abnormality noted.  The patient was sent to [facility] for higher level care.     We saw the patient at bedside in early morning, the patient has being BiPAP for breathing, 4 L of ascites was removed.     No acute upper GI symptoms or sign.      Interval History:  12/29/2023: patient seen at bedside, in chair with breakfast tray. She states she is tired and sore and wants to return to bed. Denies nausea, vomiting, hematemesis, melena, or hematochezia. Transaminitis resolved. Patient with 3175ml OP yesterday.         Hospital Medications:  Current Facility-Administered  Medications   Medication Dose Frequency Provider Last Rate Last Admin    lactulose 20 GM/30ML solution 45 mL  45 mL TID Gilmer Matthews D.O.        ceFAZolin (Ancef) 2 g in  mL IVPB  2 g Q8HRS Pilar Borges M.D.        magnesium sulfate IVPB premix 2 g  2 g Once Pilar Borges M.D. 25 mL/hr at 12/29/23 1028 2 g at 12/29/23 1028    midodrine (Proamatine) tablet 10 mg  10 mg Q8HRS Pilar Borges M.D.        senna-docusate (Pericolace Or Senokot S) 8.6-50 MG per tablet 2 Tablet  2 Tablet BID Zan Barth M.D.   2 Tablet at 12/28/23 1745    And    polyethylene glycol/lytes (Miralax) Packet 1 Packet  1 Packet QDAY PRN Zan Barth M.D.        And    magnesium hydroxide (Milk Of Magnesia) suspension 30 mL  30 mL QDAY PRN Zan Barth M.D.        And    bisacodyl (Dulcolax) suppository 10 mg  10 mg QDAY PRN Zan Barth M.D.        heparin injection 5,000 Units  5,000 Units Q8HRS Zan Barth M.D.   5,000 Units at 12/29/23 0535    acetaminophen (Tylenol) tablet 650 mg  650 mg Q6HRS PRN Zan Barth M.D.        Pharmacy Consult Request ...Pain Management Review 1 Each  1 Each PHARMACY TO DOSE Zan Barth M.D.        labetalol (Normodyne/Trandate) injection 10 mg  10 mg Q4HRS PRN Zan Barth M.D.        ondansetron (Zofran) syringe/vial injection 4 mg  4 mg Q4HRS PRN Zan Barth M.D.        ondansetron (Zofran ODT) dispertab 4 mg  4 mg Q4HRS PRN Zan Barth M.D.        promethazine (Phenergan) tablet 12.5-25 mg  12.5-25 mg Q4HRS PRN Zan Barth M.D.        promethazine (Phenergan) suppository 12.5-25 mg  12.5-25 mg Q4HRS PRN Zan Barth M.D.        prochlorperazine (Compazine) injection 5-10 mg  5-10 mg Q4HRS PRN Zan Barth M.D.        albuterol inhaler 2 Puff  2 Puff Q4HRS Zan Barth M.D.   2 Puff at 12/29/23 0953    Pharmacy Consult Request - to monitor for nephrotoxic agents  1 Each PHARMACY TO DOSE Zan Barth M.D.         "furosemide (Lasix) injection 80 mg  80 mg Q8HRS Adan Charlton M.D.   80 mg at 12/29/23 0533    riFAXIMin (Xifaxan) tablet 550 mg  550 mg BID Adan Charlton M.D.   550 mg at 12/29/23 0553    dexmedetomidine (PRECEDEX) 400 mcg/100mL NS premix infusion  0.1-1.5 mcg/kg/hr (Ideal) Continuous Adan Charlton M.D.   Stopped at 12/28/23 2234    insulin regular (HumuLIN R,NovoLIN R) injection  2-9 Units 4X/DAY ACHS Adan Charlton M.D.        And    dextrose 10 % BOLUS 25 g  25 g Q15 MIN PRN Adan Charlton M.D.        Respiratory Therapy Consult   Continuous RT Adan Charlton M.D.        albuterol (Proventil) 2.5mg/0.5ml nebulizer solution 2.5 mg  2.5 mg Q2HRS PRN (RT) Pilar Borges M.D.       Last reviewed on 12/29/2023  4:13 AM by Angel Munguia R.N.       Review of Systems:  Review of Systems   Constitutional:  Positive for malaise/fatigue. Negative for chills and fever.   HENT:  Negative for congestion and sore throat.    Respiratory:  Negative for cough and shortness of breath.    Cardiovascular:  Positive for orthopnea and leg swelling. Negative for chest pain.   Gastrointestinal:  Negative for abdominal pain, blood in stool, constipation, diarrhea, melena, nausea and vomiting.   Genitourinary:  Negative for dysuria and flank pain.   Musculoskeletal:  Positive for myalgias. Negative for falls.   Neurological:  Positive for weakness. Negative for headaches.   Psychiatric/Behavioral:  The patient is not nervous/anxious and does not have insomnia.    All other systems reviewed and are negative.        Vital signs:  Weight/BMI: Body mass index is 35.89 kg/m².  /60   Pulse 62   Temp 36.4 °C (97.5 °F) (Bladder)   Resp (!) 21   Ht 1.6 m (5' 3\")   Wt 91.9 kg (202 lb 9.6 oz)   SpO2 91%   Vitals:    12/29/23 0800 12/29/23 0900 12/29/23 0953 12/29/23 1000   BP: 105/59 99/57  105/60   Pulse: (!) 59 63  62   Resp: (!) 31 (!) 45 (!) 21 (!) 21   Temp: 36.5 °C (97.7 °F)   36.4 °C (97.5 °F)   TempSrc: " Bladder   Bladder   SpO2: 93% 96%  91%   Weight:       Height:         Oxygen Therapy:  Pulse Oximetry: 91 %, O2 (LPM): 4, FiO2%: 40 %, O2 Delivery Device: Silicone Nasal Cannula    Intake/Output Summary (Last 24 hours) at 12/29/2023 1156  Last data filed at 12/29/2023 1000  Gross per 24 hour   Intake 1895.32 ml   Output 3180 ml   Net -1284.68 ml         Physical Exam:  Physical Exam  Vitals and nursing note reviewed.   Constitutional:       General: She is awake. She is not in acute distress.     Appearance: She is morbidly obese. She is ill-appearing.   HENT:      Head: Normocephalic.      Nose: Nose normal. No congestion.      Mouth/Throat:      Mouth: Mucous membranes are moist.      Pharynx: Oropharynx is clear. No oropharyngeal exudate.   Eyes:      General: No scleral icterus.     Extraocular Movements: Extraocular movements intact.      Conjunctiva/sclera: Conjunctivae normal.   Cardiovascular:      Rate and Rhythm: Normal rate and regular rhythm.      Pulses: Normal pulses.      Heart sounds: Normal heart sounds. No murmur heard.  Pulmonary:      Effort: Pulmonary effort is normal. No respiratory distress.      Breath sounds: Normal breath sounds.   Abdominal:      General: Abdomen is flat. Bowel sounds are normal. There is distension.      Palpations: Abdomen is soft.      Tenderness: There is no abdominal tenderness. There is no guarding.   Musculoskeletal:      Right lower leg: Edema present.      Left lower leg: Edema present.   Skin:     General: Skin is warm and dry.      Capillary Refill: Capillary refill takes less than 2 seconds.      Coloration: Skin is not jaundiced.   Neurological:      General: No focal deficit present.      Mental Status: She is alert and oriented to person, place, and time. Mental status is at baseline.   Psychiatric:         Mood and Affect: Mood normal.         Behavior: Behavior is cooperative.         Thought Content: Thought content normal.         Judgment: Judgment  normal.             Labs:  Recent Labs     12/28/23 1538 12/28/23 2020 12/29/23 0148   SODIUM 137 138 136   POTASSIUM 5.8* 5.3 5.1   CHLORIDE 102 101 98   CO2 20 25 26   BUN 56* 51* 55*   CREATININE 1.39 1.39 1.39   MAGNESIUM  --   --  1.8   CALCIUM 9.0 8.6 8.7     Recent Labs     12/27/23 2130 12/28/23 0034 12/28/23 0253 12/28/23 1538 12/28/23 2020 12/29/23 0148   ALTSGPT 13 11  --   --   --  10   ASTSGOT 26 28  --   --   --  30   ALKPHOSPHAT 118* 122*  --   --   --  108*   TBILIRUBIN 1.5* 1.3  --   --   --  1.0   DBILIRUBIN 0.9*  --   --   --   --   --    GLUCOSE 90 93   < > 92 115* 121*    < > = values in this interval not displayed.     Recent Labs     12/27/23 2130 12/28/23 0034 12/29/23 0148   WBC 8.4 9.5 9.7   NEUTSPOLYS 64 66.20 67.80   LYMPHOCYTES 21.0 14.20* 12.90*   MONOCYTES 14.0* 17.70* 17.60*   EOSINOPHILS  --  0.20 1.10   BASOPHILS  --  0.60 0.40   ASTSGOT 26 28 30   ALTSGPT 13 11 10   ALKPHOSPHAT 118* 122* 108*   TBILIRUBIN 1.5* 1.3 1.0     Recent Labs     12/27/23 2130 12/28/23 0034 12/29/23 0148   RBC 5.00 4.88 4.60   HEMOGLOBIN 17.3* 16.7* 15.7   HEMATOCRIT 54.3* 53.0* 48.5*   PLATELETCT 183 180 162*   PROTHROMBTM 16.5* 18.0*  --    INR 1.62 1.47*  --      Recent Results (from the past 24 hour(s))   POCT glucose device results    Collection Time: 12/28/23 12:39 PM   Result Value Ref Range    POC Glucose, Blood 95 65 - 99 mg/dL   PROTEIN/CREAT RATIO URINE    Collection Time: 12/28/23 12:45 PM   Result Value Ref Range    Total Protein, Urine 11.0 0.0 - 15.0 mg/dL    Creatinine, Random Urine 36.02 mg/dL    Protein Creatinine Ratio 305 (H) 10 - 107 mg/g   Basic Metabolic Panel    Collection Time: 12/28/23  3:38 PM   Result Value Ref Range    Sodium 137 135 - 145 mmol/L    Potassium 5.8 (H) 3.6 - 5.5 mmol/L    Chloride 102 96 - 112 mmol/L    Co2 20 20 - 33 mmol/L    Glucose 92 65 - 99 mg/dL    Bun 56 (H) 8 - 22 mg/dL    Creatinine 1.39 0.50 - 1.40 mg/dL    Calcium 9.0 8.5 - 10.5 mg/dL     Anion Gap 15.0 7.0 - 16.0   ESTIMATED GFR    Collection Time: 12/28/23  3:38 PM   Result Value Ref Range    GFR (CKD-EPI) 43 (A) >60 mL/min/1.73 m 2   Basic Metabolic Panel    Collection Time: 12/28/23  8:20 PM   Result Value Ref Range    Sodium 138 135 - 145 mmol/L    Potassium 5.3 3.6 - 5.5 mmol/L    Chloride 101 96 - 112 mmol/L    Co2 25 20 - 33 mmol/L    Glucose 115 (H) 65 - 99 mg/dL    Bun 51 (H) 8 - 22 mg/dL    Creatinine 1.39 0.50 - 1.40 mg/dL    Calcium 8.6 8.5 - 10.5 mg/dL    Anion Gap 12.0 7.0 - 16.0   ESTIMATED GFR    Collection Time: 12/28/23  8:20 PM   Result Value Ref Range    GFR (CKD-EPI) 43 (A) >60 mL/min/1.73 m 2   POCT glucose device results    Collection Time: 12/28/23  8:24 PM   Result Value Ref Range    POC Glucose, Blood 112 (H) 65 - 99 mg/dL   CBC with Differential    Collection Time: 12/29/23  1:48 AM   Result Value Ref Range    WBC 9.7 4.8 - 10.8 K/uL    RBC 4.60 4.20 - 5.40 M/uL    Hemoglobin 15.7 12.0 - 16.0 g/dL    Hematocrit 48.5 (H) 37.0 - 47.0 %    .4 (H) 81.4 - 97.8 fL    MCH 34.1 (H) 27.0 - 33.0 pg    MCHC 32.4 32.2 - 35.5 g/dL    RDW 59.5 (H) 35.9 - 50.0 fL    Platelet Count 162 (L) 164 - 446 K/uL    MPV 10.4 9.0 - 12.9 fL    Neutrophils-Polys 67.80 44.00 - 72.00 %    Lymphocytes 12.90 (L) 22.00 - 41.00 %    Monocytes 17.60 (H) 0.00 - 13.40 %    Eosinophils 1.10 0.00 - 6.90 %    Basophils 0.40 0.00 - 1.80 %    Immature Granulocytes 0.20 0.00 - 0.90 %    Nucleated RBC 0.00 0.00 - 0.20 /100 WBC    Neutrophils (Absolute) 6.56 1.82 - 7.42 K/uL    Lymphs (Absolute) 1.25 1.00 - 4.80 K/uL    Monos (Absolute) 1.70 (H) 0.00 - 0.85 K/uL    Eos (Absolute) 0.11 0.00 - 0.51 K/uL    Baso (Absolute) 0.04 0.00 - 0.12 K/uL    Immature Granulocytes (abs) 0.02 0.00 - 0.11 K/uL    NRBC (Absolute) 0.00 K/uL   Comp Metabolic Panel (CMP)    Collection Time: 12/29/23  1:48 AM   Result Value Ref Range    Sodium 136 135 - 145 mmol/L    Potassium 5.1 3.6 - 5.5 mmol/L    Chloride 98 96 - 112 mmol/L     Co2 26 20 - 33 mmol/L    Glucose 121 (H) 65 - 99 mg/dL    Bun 55 (H) 8 - 22 mg/dL    Creatinine 1.39 0.50 - 1.40 mg/dL    Calcium 8.7 8.5 - 10.5 mg/dL    Anion Gap 12.0 7.0 - 16.0    Correct Calcium 9.4 8.5 - 10.5 mg/dL    AST(SGOT) 30 12 - 45 U/L    ALT(SGPT) 10 2 - 50 U/L    Alkaline Phosphatase 108 (H) 30 - 99 U/L    Total Bilirubin 1.0 0.1 - 1.5 mg/dL    Albumin 3.1 (L) 3.2 - 4.9 g/dL    Total Protein 6.3 6.0 - 8.2 g/dL    Globulin 3.2 1.9 - 3.5 g/dL    A-G Ratio 1.0 g/dL   proBrain Natriuretic Peptide, NT    Collection Time: 12/29/23  1:48 AM   Result Value Ref Range    NT-proBNP 5820 (H) 0 - 125 pg/mL   Magnesium: Tomorrow AM    Collection Time: 12/29/23  1:48 AM   Result Value Ref Range    Magnesium 1.8 1.5 - 2.5 mg/dL   ESTIMATED GFR    Collection Time: 12/29/23  1:48 AM   Result Value Ref Range    GFR (CKD-EPI) 43 (A) >60 mL/min/1.73 m 2   POCT venous blood gas device results    Collection Time: 12/29/23  3:37 AM   Result Value Ref Range    Ph 7.240 (L) 7.310 - 7.450    Pco2 79.8 (H) 41.0 - 51.0 mmHg    Po2 37 25 - 40 mmHg    Tco2 37 (H) 20 - 33 mmol/L    SO2 57 %    Hco3 34.2 (H) 24.0 - 28.0 mmol/L    BE 3 -4 - 3 mmol/L    Body Temp 36.3 C degrees    Ph Temp Correc 7.250 (L) 7.310 - 7.450    Pco2 Temp Gretchen 77.4 (H) 41.0 - 51.0 mmHg    Po2 Temp Corre 35 25 - 40 mmHg    Specimen Venous     DelSys Other     LPM 5 lpm   POCT lactate device results    Collection Time: 12/29/23  3:37 AM   Result Value Ref Range    iStat Lactate 1.2 0.5 - 2.0 mmol/L   POCT arterial blood gas device results    Collection Time: 12/29/23  5:04 AM   Result Value Ref Range    Ph 7.280 (LL) 7.400 - 7.500    Pco2 62.6 (HH) 26.0 - 37.0 mmHg    Po2 69 64 - 87 mmHg    Tco2 31 20 - 33 mmol/L    S02 90 (L) 93 - 99 %    Hco3 29.4 (H) 17.0 - 25.0 mmol/L    BE 0 -4 - 3 mmol/L    Body Temp 36.3 C degrees    Ph Temp Gretchen 7.290 (LL) 7.400 - 7.500    Pco2 Temp Co 60.7 (HH) 26.0 - 37.0 mmHg    Po2 Temp Cor 66 64 - 87 mmHg    Specimen Arterial      DelSys Other     LPM 5 lpm   POCT glucose device results    Collection Time: 12/29/23  9:02 AM   Result Value Ref Range    POC Glucose, Blood 120 (H) 65 - 99 mg/dL       Radiology Review:  US-EXTREMITY VENOUS LOWER BILAT   Final Result      CT-HEAD W/O   Final Result      No acute process.         US-RENAL   Final Result      Normal exam.      DX-CHEST-LIMITED (1 VIEW)   Final Result         No pulmonary infiltrates or consolidations are noted.      Cardiomegaly.      EC-ECHOCARDIOGRAM COMPLETE W/O CONT    (Results Pending)         MDM (Data Review):   -Records reviewed and summarized in current documentation  -I personally reviewed and interpreted the laboratory results  -I personally reviewed the radiology images    Assessment/Recommendations:  Impression:  Cirrhosis with ascites s/p paracentesis in emergency department.  Negative for SBP.  Fatty liver   Acute hypoxic and hypercapnic respiratory failure  Pulmonary hypertension  TARUN  Type 2 diabetes mellitus  Alcohol use    MDM:  Is a 63-year-old female with a past medical history as listed above who presented to [facility] on 12/28/2023 with acute hypoxic and hypercapnic respiratory failure and TARUN.  Mild transaminitis on arrival.  Additionally, patient had 4 L removed on paracentesis emergency department.  Patient with aggressive diuresis during admission.  LFTs decreasing.  Suspect patient's elevated LFTs were secondary to fluid overload not necessarily decompensation of liver disease itself.  TARUN improved.    Plan:  Recommend diuresis per primary team-  When normal fluid state achieved, recommend spironolactone 100 mg p.o. daily, Lasix 40 mg p.o. daily at minimum for management of ascites.  Continue rifaximin  Continue lactulose- titrate to 2-3 loose BM/day.     No further inventions from acute GI team.  GI to sign off.  Please reconsult for any further questions or concerns.    Discussed with patient, nursing, Dr. Jony Dr.  S[place]      Core Quality Measures   Reviewed items::  Labs, Medications and Radiology reports reviewed

## 2023-12-29 NOTE — DISCHARGE PLANNING
Care Transition Team Assessment    LSW met with pt at bedside to complete DC assessment. LSW Introduced self and department roles. Pt A&Ox4 and able to verify the information on the face sheet. Pt lives alone in a single-story house. Pt verified address as [address] [place] [address]. Pt states she does not have a PCP yet as she recently moved here. Prior to this hospitalization pt was independent at home with ADLs and most IADLs. Pt does not use any DME at baseline. Pt reported that her brother is a good support for her. Pt stated her brother lives in [place], [place]. Pt denies any SA or MH concerns. Pt does not have an advance directive.     Information Source  Orientation Level: Oriented X4  Information Given By: Patient  Who is responsible for making decisions for patient? : Patient    Readmission Evaluation  Is this a readmission?: No    Elopement Risk  Legal Hold: No  Ambulatory or Self Mobile in Wheelchair: Yes  Disoriented: No  Psychiatric Symptoms: None  History of Wandering: No  Elopement this Admit: No  Vocalizing Wanting to Leave: No  Displays Behaviors, Body Language Wanting to Leave: No-Not at Risk for Elopement  Elopement Risk: Not at Risk for Elopement    Interdisciplinary Discharge Planning  Lives with - Patient's Self Care Capacity: Alone and Able to Care For Self  Patient or legal guardian wants to designate a caregiver: No  Support Systems: Family Member(s)  Housing / Facility: 1 Story House  Durable Medical Equipment: Not Applicable    Discharge Preparedness  What is your plan after discharge?: Home with help  What are your discharge supports?: Sibling  Prior Functional Level: Ambulatory, Independent with Activities of Daily Living, Independent with Medication Management  Difficulity with ADLs: None  Difficulity with IADLs: None    Functional Assesment  Prior Functional Level: Ambulatory, Independent with Activities of Daily Living, Independent with Medication Management    Finances  Financial  Barriers to Discharge: No  Prescription Coverage: Yes    Vision / Hearing Impairment  Vision Impairment : Yes  Right Eye Vision: Impaired, Wears Glasses  Left Eye Vision: Impaired, Wears Glasses  Hearing Impairment : No    Advance Directive  Advance Directive?: None    Domestic Abuse  Have you ever been the victim of abuse or violence?: No  Physical Abuse or Sexual Abuse: No  Verbal Abuse or Emotional Abuse: No  Possible Abuse/Neglect Reported to:: Not Applicable    Psychological Assessment  History of Substance Abuse: None  History of Psychiatric Problems: No  Non-compliant with Treatment: No    Discharge Risks or Barriers  Patient risk factors: Lives alone and no community support, No PCP    Anticipated Discharge Information  Discharge Disposition: Discharged to home/self care (01)  Discharge Address: [address] [place] [address] [place]   [facility] [place] [address]

## 2023-12-29 NOTE — PROGRESS NOTES
Late entry: BMP resulted, K 5.8. Dr. Matthews notified, orders received for dextrose and IV insulin. BG 80, post dextrose 197. IV insulin given. Repeat BMP sent.

## 2023-12-29 NOTE — ED PROVIDER NOTES
CHIEF COMPLAINT  Chief Complaint   Patient presents with    Shortness of Breath     BIB EMS; transfer from [place]; SOB x 1 week; denies any chest pain, N/V; AOX4 GCS15       LIMITATION TO HISTORY   Select:     DAPHNE Bernard is a 63 y.o. female who presents to the Emergency Department as transfer from [facility] for evaluation of cirrhosis, acute kidney injury, fluid overload, and acute hypoxemic respiratory failure felt to be from fluid overload patient reports over the last several days she has been taking more of her Lasix as she has been feeling short of breath she reports her abdomen is more distended denies fever denies abdominal pain reports distant history of paracentesis no recent paracentesis patient requiring 5 L/min of oxygen to maintain saturations which is new for her  OUTSIDE HISTORIAN(S):  Select:    EXTERNAL RECORDS REVIEWED  Select: Reviewed ER physician's note from today from [facility] Dr. Primitivo concepcion      PAST MEDICAL HISTORY  Past Medical History:   Diagnosis Date    Chronic obstructive pulmonary disease (HCC)     Cirrhosis (HCC)      .    SURGICAL HISTORY  History reviewed. No pertinent surgical history.      FAMILY HISTORY  History reviewed. No pertinent family history.       SOCIAL HISTORY  Social History     Socioeconomic History    Marital status:      Spouse name: Not on file    Number of children: Not on file    Years of education: Not on file    Highest education level: Not on file   Occupational History    Not on file   Tobacco Use    Smoking status: Never    Smokeless tobacco: Never   Vaping Use    Vaping Use: Never used   Substance and Sexual Activity    Alcohol use: Not Currently    Drug use: Never    Sexual activity: Not on file   Other Topics Concern    Not on file   Social History Narrative    Not on file     Social Determinants of Health     Financial Resource Strain: Not on file   Food Insecurity: Not on file   Transportation Needs: Not  "on file   Physical Activity: Not on file   Stress: Not on file   Social Connections: Not on file   Intimate Partner Violence: Not on file   Housing Stability: Not on file         CURRENT MEDICATIONS  No current facility-administered medications on file prior to encounter.     Current Outpatient Medications on File Prior to Encounter   Medication Sig Dispense Refill    gabapentin (NEURONTIN) 300 MG Cap Take 600 mg by mouth 4 times a day.      albuterol (VENTOLIN OR PROVENTIL) 108 (90 BASE) MCG/ACT AERS Inhale 2 Puffs by mouth every 4 hours. 1 Inhaler 0    clindamycin (CLEOCIN T) 1 % GEL Apply  to affected area(s) 2 Times a Day. use thin film on affected area 1 Tube 1    clobetasol (TEMOVATE) 0.05 % OINT Apply  to affected area(s) 2 Times a Day. Apply as directed above 1 Tube 1    diphenhydramine-ZnAcetate (BENADRYL ITCH) 1-0.1 % CREA Apply  to affected area(s) 2 Times a Day. Apply as directed 1 Tube 1    pramoxine-calamine 1-8% (CALADRYL) lotion Apply 1 Inch to affected area(s) 4 times a day. 1 Bottle 1           ALLERGIES  Allergies   Allergen Reactions    Pcn [Penicillins]        PHYSICAL EXAM  VITAL SIGNS:BP 90/51   Pulse (!) 57   Temp 36.1 °C (97 °F) (Bladder)   Resp (!) 21   Ht 1.6 m (5' 3\")   Wt 91.5 kg (201 lb 11.5 oz)   SpO2 94%   BMI 35.73 kg/m²       GENERAL: Awake and alert, facemask in place  HEAD: Normocephalic and atraumatic  NECK: Normal range of motion, without meningismus  EYES: Pupils Equal, Round, Reactive to Light, extraocular movements intact, conjunctiva white  ENT: Mucous membranes moist, oropharynx clear  PULMONARY: Normal effort, clear to auscultation  CARDIOVASCULAR: No murmurs, clicks or rubs, peripheral pulses 2+  ABDOMINAL: Distended, non-tender, no guarding or rigidity present, no pulsatile masses  BACK: no midline tenderness, no costovertebral tenderness  NEUROLOGICAL: Grossly non-focal neurological examination, speech normal, gait normal  EXTREMITIES: 1+ bilateral pretibial " pitting edema, normal to inspection  SKIN: Warm and dry.  PSYCHIATRIC: Affect is appropriate    DIAGNOSTIC STUDIES / PROCEDURES  EKG  CRITICAL CARE  The very real possibilty of a deterioration of this patient's condition required the highest level of my preparedness for sudden, emergent intervention.  Patient presenting with fluid overload hyperkalemia acute kidney injury, patient treated for hyperkalemia emergently to stabalize her cardiac membranes. I provided critical care services, which included medication orders, frequent reevaluations of the patient's condition and response to treatment, ordering and reviewing test results, and discussing the case with various consultants.  The critical care time associated with the care of the patient was 33 minutes. Review chart for interventions. This time is exclusive of any other billable procedures.   EKG Interpretation: I independently reviewed the below EKG and did not see signs of a STEMI  12:32 AM  Normal sinus rhythm  Rate of 67  Low voltage QRS extremity and precordial leads  No peaked T waves  No atrial fibrillation is present    Paracentesis Procedure Note  Indication: Sampling of ascitic fluid, symptomatic relief  Provider: Self  A time-out was performed verifying the corrent patient, procedure, site, positioning, and equipment. Using ultrasound, an adequate pocket of intraperitoneal fluid RLQ of abdomen was located without any visible arteries in the proposed needle path. The area was marked with a skin glen. The area was cleansed and draped in usual sterile fashion using chlorhexidine scrub. 5mL of  1% lidocaine was used to numb the skin, soft tissue and peritoneum. The paracentesis catheter was inserted and advanced with negative pressure until [straw] colored fluid was aspirated. Approximately 60 mL of ascitic fluid was collected and sent for laboratory analysis. The catheter was then connected to the vaccutainer and 4 liters of additional ascitic fluid  were drained. The catheter was removed and no leaking was noted. A bandaid was placed over the puncture wound. The patient tolerated the procedure well without any immediate complications.   Estimated Blood Loss: 5mL  Total time for Procedure: 20mins        LABS  Labs Reviewed   CBC WITH DIFFERENTIAL - Abnormal; Notable for the following components:       Result Value    Hemoglobin 16.7 (*)     Hematocrit 53.0 (*)     .6 (*)     MCH 34.2 (*)     MCHC 31.5 (*)     RDW 61.7 (*)     Lymphocytes 14.20 (*)     Monocytes 17.70 (*)     Immature Granulocytes 1.10 (*)     Monos (Absolute) 1.67 (*)     All other components within normal limits    Narrative:     Biotin intake of greater than 5 mg per day may interfere with                  troponin levels, causing false low values.                  Indicate which anticoagulants the patient is on:->NONE   COMP METABOLIC PANEL - Abnormal; Notable for the following components:    Potassium 6.1 (*)     Bun 57 (*)     Creatinine 1.50 (*)     Alkaline Phosphatase 122 (*)     All other components within normal limits    Narrative:     Biotin intake of greater than 5 mg per day may interfere with                  troponin levels, causing false low values.                  Indicate which anticoagulants the patient is on:->NONE   TROPONIN - Abnormal; Notable for the following components:    Troponin T 30 (*)     All other components within normal limits    Narrative:     Biotin intake of greater than 5 mg per day may interfere with                  troponin levels, causing false low values.                  Indicate which anticoagulants the patient is on:->NONE   PROTHROMBIN TIME - Abnormal; Notable for the following components:    PT 18.0 (*)     INR 1.47 (*)     All other components within normal limits    Narrative:     Biotin intake of greater than 5 mg per day may interfere with                  troponin levels, causing false low values.                  Indicate which  anticoagulants the patient is on:->NONE   ESTIMATED GFR - Abnormal; Notable for the following components:    GFR (CKD-EPI) 39 (*)     All other components within normal limits    Narrative:     Biotin intake of greater than 5 mg per day may interfere with                  troponin levels, causing false low values.                  Indicate which anticoagulants the patient is on:->NONE   BASIC METABOLIC PANEL - Abnormal; Notable for the following components:    Potassium 7.0 (*)     Bun 57 (*)     Creatinine 1.48 (*)     All other components within normal limits   TSH WITH REFLEX TO FT4 - Abnormal; Notable for the following components:    TSH 7.170 (*)     All other components within normal limits   VENOUS BLOOD GAS - Abnormal; Notable for the following components:    Venous Bg Ph 7.13 (*)     Venous Bg Ph Temp Corrected 7.14 (*)     Venous Bg Pco2 79.1 (*)     Venous Bg Pco2 Temp Corrected 77.1 (*)     Venous Bg Po2 48.8 (*)     Venous Bg Po2 Temp Corrected 46.8 (*)     All other components within normal limits   URINALYSIS - Abnormal; Notable for the following components:    Protein 100 (*)     Occult Blood Small (*)     All other components within normal limits    Narrative:     FENa = (UrineNa / SerumNA) / (UrineCr / SerumCr) *100   D-DIMER - Abnormal; Notable for the following components:    D-Dimer 4.02 (*)     All other components within normal limits   VITAMIN B12 - Abnormal; Notable for the following components:    Vitamin B12 -True Cobalamin 3307 (*)     All other components within normal limits   URINE MICROSCOPIC (W/UA) - Abnormal; Notable for the following components:    RBC 10-20 (*)     Hyaline Cast 3-5 (*)     All other components within normal limits    Narrative:     FENa = (UrineNa / SerumNA) / (UrineCr / SerumCr) *100   ESTIMATED GFR - Abnormal; Notable for the following components:    GFR (CKD-EPI) 40 (*)     All other components within normal limits   BASIC METABOLIC PANEL - Abnormal; Notable  for the following components:    Glucose 131 (*)     Bun 56 (*)     Creatinine 1.49 (*)     All other components within normal limits   HEMOGLOBIN A1C - Abnormal; Notable for the following components:    Glycohemoglobin 6.4 (*)     All other components within normal limits   BASIC METABOLIC PANEL - Abnormal; Notable for the following components:    Potassium 5.6 (*)     Glucose 115 (*)     Bun 52 (*)     Creatinine 1.57 (*)     All other components within normal limits   ESTIMATED GFR - Abnormal; Notable for the following components:    GFR (CKD-EPI) 39 (*)     All other components within normal limits   CRP QUANTITIVE (NON-CARDIAC) - Abnormal; Notable for the following components:    Stat C-Reactive Protein 7.85 (*)     All other components within normal limits   PROCALCITONIN - Abnormal; Notable for the following components:    Procalcitonin 0.28 (*)     All other components within normal limits   ESTIMATED GFR - Abnormal; Notable for the following components:    GFR (CKD-EPI) 37 (*)     All other components within normal limits   PROTEIN/CREAT RATIO URINE - Abnormal; Notable for the following components:    Protein Creatinine Ratio 305 (*)     All other components within normal limits   BASIC METABOLIC PANEL - Abnormal; Notable for the following components:    Potassium 5.8 (*)     Bun 56 (*)     All other components within normal limits   ESTIMATED GFR - Abnormal; Notable for the following components:    GFR (CKD-EPI) 43 (*)     All other components within normal limits   BASIC METABOLIC PANEL - Abnormal; Notable for the following components:    Glucose 115 (*)     Bun 51 (*)     All other components within normal limits    Narrative:     RECOLLECT   ESTIMATED GFR - Abnormal; Notable for the following components:    GFR (CKD-EPI) 43 (*)     All other components within normal limits    Narrative:     RECOLLECT   POCT GLUCOSE DEVICE RESULTS - Abnormal; Notable for the following components:    POC Glucose, Blood  "107 (*)     All other components within normal limits   POCT GLUCOSE DEVICE RESULTS - Abnormal; Notable for the following components:    POC Glucose, Blood 112 (*)     All other components within normal limits   LACTIC ACID    Narrative:     Biotin intake of greater than 5 mg per day may interfere with                  troponin levels, causing false low values.                  Indicate which anticoagulants the patient is on:->NONE   COV-2, FLU A/B, AND RSV BY PCR (CEPHEID)    Narrative:     Release to patient->Immediate   FLUID CELL COUNT   FLUID ALBUMIN   FLUID GLUCOSE   URINE DRUG SCREEN    Narrative:     FENa = (UrineNa / SerumNA) / (UrineCr / SerumCr) *100   URINE SODIUM RANDOM    Narrative:     FENa = (UrineNa / SerumNA) / (UrineCr / SerumCr) *100   URINE CREATININE RANDOM    Narrative:     FENa = (UrineNa / SerumNA) / (UrineCr / SerumCr) *100   FREE THYROXINE   MRSA BY PCR (AMP)   CREATINE KINASE   HEPATITIS PANEL ACUTE(4 COMPONENTS)   AMMONIA   FREE THYROXINE   GRAM STAIN   BLOOD CULTURE    Narrative:     1 of 2 for Blood Culture x 2 sites order. Per Hospital                  Policy: Only change Specimen Src: to \"Line\" if specified by                  physician order.   BLOOD CULTURE    Narrative:     2 of 2 blood culture x2  Sites order. Per Hospital Policy:                  Only change Specimen Src: to \"Line\" if specified by physician                  order.   FLUID CULTURE W/GRAM STAIN   CBC WITH DIFFERENTIAL   COMP METABOLIC PANEL   PROBRAIN NATRIURETIC PEPTIDE, NT   MAGNESIUM   BASIC METABOLIC PANEL   POCT GLUCOSE   POCT GLUCOSE   POCT GLUCOSE   POCT GLUCOSE   POCT GLUCOSE   POCT GLUCOSE   POCT GLUCOSE   POCT GLUCOSE   POCT GLUCOSE   POCT GLUCOSE   POCT GLUCOSE   POCT GLUCOSE   POCT GLUCOSE DEVICE RESULTS         RADIOLOGY  I independently reviewed and interpreted the images obtained today in the ER.  Chest radiograph with evidence of mild pulmonary edema bilaterally    Radiologist interpretation: "   US-EXTREMITY VENOUS LOWER BILAT   Final Result      CT-HEAD W/O   Final Result      No acute process.         US-RENAL   Final Result      Normal exam.      DX-CHEST-LIMITED (1 VIEW)   Final Result         No pulmonary infiltrates or consolidations are noted.      Cardiomegaly.      EC-ECHOCARDIOGRAM COMPLETE W/O CONT    (Results Pending)        COURSE & MEDICAL DECISION MAKING    ED COURSE:        INTERVENTIONS BY ME:  Medications   senna-docusate (Pericolace Or Senokot S) 8.6-50 MG per tablet 2 Tablet (2 Tablets Oral Given 12/28/23 1745)     And   polyethylene glycol/lytes (Miralax) Packet 1 Packet (has no administration in time range)     And   magnesium hydroxide (Milk Of Magnesia) suspension 30 mL (has no administration in time range)     And   bisacodyl (Dulcolax) suppository 10 mg (has no administration in time range)   heparin injection 5,000 Units (5,000 Units Subcutaneous Given 12/28/23 2122)   acetaminophen (Tylenol) tablet 650 mg (has no administration in time range)   Pharmacy Consult Request ...Pain Management Review 1 Each (has no administration in time range)   labetalol (Normodyne/Trandate) injection 10 mg (has no administration in time range)   ondansetron (Zofran) syringe/vial injection 4 mg (has no administration in time range)   ondansetron (Zofran ODT) dispertab 4 mg (has no administration in time range)   promethazine (Phenergan) tablet 12.5-25 mg (has no administration in time range)   promethazine (Phenergan) suppository 12.5-25 mg (has no administration in time range)   prochlorperazine (Compazine) injection 5-10 mg (has no administration in time range)   albuterol inhaler 2 Puff (2 Puffs Inhalation Given 12/28/23 2131)   Pharmacy Consult Request - to monitor for nephrotoxic agents (has no administration in time range)   furosemide (Lasix) injection 80 mg (80 mg Intravenous Given 12/28/23 2122)   Linezolid (Zyvox) premix 600 mg (0 mg Intravenous Stopped 12/28/23 1842)   cefepime  (Maxipime) 2 g in  mL IVPB (0 g Intravenous Stopped 12/28/23 0901)   riFAXIMin (Xifaxan) tablet 550 mg (550 mg Oral Given 12/28/23 1745)   lactulose 20 GM/30ML solution 30 mL (30 mL Oral Given 12/28/23 1745)   dexmedetomidine (PRECEDEX) 400 mcg/100mL NS premix infusion (0 mcg/kg/hr × 52.4 kg (Ideal) Intravenous Stopped 12/28/23 2234)   insulin regular (HumuLIN R,NovoLIN R) injection (0 Units Subcutaneous Dose not Required 12/28/23 2100)     And   dextrose 10 % BOLUS 25 g (has no administration in time range)   Respiratory Therapy Consult (has no administration in time range)   midodrine (Proamatine) tablet 5 mg (5 mg Oral Given 12/28/23 2122)   albuterol (Proventil) 2.5mg/0.5ml nebulizer solution 2.5 mg (has no administration in time range)   dextrose 10 % BOLUS 25 g (0 g Intravenous Stopped 12/28/23 0515)     And   insulin regular (HumuLIN R,NovoLIN R) injection (4.5 Units Intravenous Given 12/28/23 0354)   albuterol (Proventil) 5 mg/mL nebulizer solution 10 mg (10 mg Nebulization Given 12/28/23 0253)   calcium GLUConate 2 g in NaCl IVPB premix (0 g Intravenous Stopped 12/28/23 0457)   sodium bicarbonate 8.4 % injection 50 mEq (50 mEq Intravenous Given 12/28/23 0454)   dextrose 10 % BOLUS 25 g (0 g Intravenous Stopped 12/28/23 1805)     And   insulin regular (HumuLIN R,NovoLIN R) injection (4.5 Units Intravenous Given 12/28/23 1806)       INITIAL ASSESSMENT, COURSE AND PLAN  Care Narrative:       Patient presenting with hypoxia with signs of significant amount of ascites on exam and bedside ultrasound, patient hypoxic chest radiograph on my interpretation reveals some mild pulmonary edema suspect this is secondary to fluid overload patient has been taking her Lasix.  Feel pulmonary embolism is less likely given her signs of fluid overload and abnormal chest radiograph and feel D-dimer level will be spuriously elevated.    Paracentesis obtained in the emergency department her abdomen is distended but nontender  do not suspect surgical abdomen, as she was nontender afebrile without leukocytosis do not feel CT scan is of utility.    Blood work notable for acute kidney injury and hyperkalemia without obvious EKG changes given the severe level of hyperkalemia calcium insulin and albuterol were provided, feel shifting is appropriate for now do not feel patient needs emergent dialysis at this moment.    Blood cultures and lactic acid obtained do not see source of infection paracentesis pending at time of dictation do not see indication for antibiotics.    Patient's hemoglobin was elevated otherwise CBC is unremarkable.     ADDITIONAL PROBLEM LIST    DISPOSITION AND DISCUSSIONS  I have discussed management of the patient with the following physicians and DUNCAN's: I disccused the mangament and decision to admit this patient with the Hospitalist. Dr. Barth    Discussion of management with other Hospitals in [place] or appropriate source(s): None     FINAL DIAGNOSIS  1. Anasarca    2. History of cirrhosis    3. Alcoholic hepatitis with ascites    4. Acute hypoxemic respiratory failure (HCC)    5. Acute kidney injury (HCC)    6. Hyperkalemia             Electronically signed by: Pato Cox DO ,12:38 AM 12/29/23

## 2023-12-30 ENCOUNTER — APPOINTMENT (OUTPATIENT)
Dept: CARDIOLOGY | Facility: MEDICAL CENTER | Age: 63
DRG: 441 | End: 2023-12-30
Attending: INTERNAL MEDICINE
Payer: MEDICAID

## 2023-12-30 LAB
ANION GAP SERPL CALC-SCNC: 11 MMOL/L (ref 7–16)
BASOPHILS # BLD AUTO: 0.7 % (ref 0–1.8)
BASOPHILS # BLD: 0.06 K/UL (ref 0–0.12)
BUN SERPL-MCNC: 45 MG/DL (ref 8–22)
CALCIUM SERPL-MCNC: 8.5 MG/DL (ref 8.5–10.5)
CHLORIDE SERPL-SCNC: 96 MMOL/L (ref 96–112)
CO2 SERPL-SCNC: 28 MMOL/L (ref 20–33)
CREAT SERPL-MCNC: 1.3 MG/DL (ref 0.5–1.4)
EOSINOPHIL # BLD AUTO: 0.15 K/UL (ref 0–0.51)
EOSINOPHIL NFR BLD: 1.7 % (ref 0–6.9)
ERYTHROCYTE [DISTWIDTH] IN BLOOD BY AUTOMATED COUNT: 56.7 FL (ref 35.9–50)
GFR SERPLBLD CREATININE-BSD FMLA CKD-EPI: 46 ML/MIN/1.73 M 2
GLUCOSE BLD STRIP.AUTO-MCNC: 107 MG/DL (ref 65–99)
GLUCOSE BLD STRIP.AUTO-MCNC: 117 MG/DL (ref 65–99)
GLUCOSE SERPL-MCNC: 113 MG/DL (ref 65–99)
HCT VFR BLD AUTO: 46.7 % (ref 37–47)
HGB BLD-MCNC: 15.3 G/DL (ref 12–16)
IMM GRANULOCYTES # BLD AUTO: 0.03 K/UL (ref 0–0.11)
IMM GRANULOCYTES NFR BLD AUTO: 0.3 % (ref 0–0.9)
LV EJECT FRACT MOD 2C 99903: 56.83
LV EJECT FRACT MOD 4C 99902: 55.57
LV EJECT FRACT MOD BP 99901: 57.42
LYMPHOCYTES # BLD AUTO: 1.17 K/UL (ref 1–4.8)
LYMPHOCYTES NFR BLD: 13.5 % (ref 22–41)
MCH RBC QN AUTO: 33.8 PG (ref 27–33)
MCHC RBC AUTO-ENTMCNC: 32.8 G/DL (ref 32.2–35.5)
MCV RBC AUTO: 103.1 FL (ref 81.4–97.8)
MONOCYTES # BLD AUTO: 1.39 K/UL (ref 0–0.85)
MONOCYTES NFR BLD AUTO: 16.1 % (ref 0–13.4)
NEUTROPHILS # BLD AUTO: 5.86 K/UL (ref 1.82–7.42)
NEUTROPHILS NFR BLD: 67.7 % (ref 44–72)
NRBC # BLD AUTO: 0 K/UL
NRBC BLD-RTO: 0 /100 WBC (ref 0–0.2)
PLATELET # BLD AUTO: 151 K/UL (ref 164–446)
PMV BLD AUTO: 10 FL (ref 9–12.9)
POTASSIUM SERPL-SCNC: 4.6 MMOL/L (ref 3.6–5.5)
RBC # BLD AUTO: 4.53 M/UL (ref 4.2–5.4)
SODIUM SERPL-SCNC: 135 MMOL/L (ref 135–145)
WBC # BLD AUTO: 8.7 K/UL (ref 4.8–10.8)

## 2023-12-30 PROCEDURE — 700102 HCHG RX REV CODE 250 W/ 637 OVERRIDE(OP): Performed by: INTERNAL MEDICINE

## 2023-12-30 PROCEDURE — A9270 NON-COVERED ITEM OR SERVICE: HCPCS

## 2023-12-30 PROCEDURE — 700105 HCHG RX REV CODE 258: Performed by: INTERNAL MEDICINE

## 2023-12-30 PROCEDURE — 700111 HCHG RX REV CODE 636 W/ 250 OVERRIDE (IP): Performed by: INTERNAL MEDICINE

## 2023-12-30 PROCEDURE — 94660 CPAP INITIATION&MGMT: CPT

## 2023-12-30 PROCEDURE — 80048 BASIC METABOLIC PNL TOTAL CA: CPT

## 2023-12-30 PROCEDURE — A9270 NON-COVERED ITEM OR SERVICE: HCPCS | Performed by: INTERNAL MEDICINE

## 2023-12-30 PROCEDURE — 700102 HCHG RX REV CODE 250 W/ 637 OVERRIDE(OP)

## 2023-12-30 PROCEDURE — 99233 SBSQ HOSP IP/OBS HIGH 50: CPT | Performed by: HOSPITALIST

## 2023-12-30 PROCEDURE — 85025 COMPLETE CBC W/AUTO DIFF WBC: CPT

## 2023-12-30 PROCEDURE — 700102 HCHG RX REV CODE 250 W/ 637 OVERRIDE(OP): Performed by: HOSPITALIST

## 2023-12-30 PROCEDURE — 770006 HCHG ROOM/CARE - MED/SURG/GYN SEMI*

## 2023-12-30 PROCEDURE — 700111 HCHG RX REV CODE 636 W/ 250 OVERRIDE (IP): Mod: JZ | Performed by: INTERNAL MEDICINE

## 2023-12-30 PROCEDURE — 700111 HCHG RX REV CODE 636 W/ 250 OVERRIDE (IP): Mod: JZ | Performed by: HOSPITALIST

## 2023-12-30 PROCEDURE — 82962 GLUCOSE BLOOD TEST: CPT | Mod: 91

## 2023-12-30 PROCEDURE — 93306 TTE W/DOPPLER COMPLETE: CPT | Mod: 26 | Performed by: INTERNAL MEDICINE

## 2023-12-30 PROCEDURE — A9270 NON-COVERED ITEM OR SERVICE: HCPCS | Performed by: HOSPITALIST

## 2023-12-30 PROCEDURE — 93306 TTE W/DOPPLER COMPLETE: CPT

## 2023-12-30 RX ORDER — CEPHALEXIN 500 MG/1
500 CAPSULE ORAL EVERY 6 HOURS
Status: COMPLETED | OUTPATIENT
Start: 2023-12-30 | End: 2024-01-02

## 2023-12-30 RX ADMIN — ALBUTEROL SULFATE 2 PUFF: 90 AEROSOL, METERED RESPIRATORY (INHALATION) at 01:49

## 2023-12-30 RX ADMIN — FUROSEMIDE 80 MG: 10 INJECTION, SOLUTION INTRAMUSCULAR; INTRAVENOUS at 05:07

## 2023-12-30 RX ADMIN — CEFAZOLIN 2 G: 2 INJECTION, POWDER, FOR SOLUTION INTRAMUSCULAR; INTRAVENOUS at 13:46

## 2023-12-30 RX ADMIN — ALBUTEROL SULFATE 2 PUFF: 90 AEROSOL, METERED RESPIRATORY (INHALATION) at 13:44

## 2023-12-30 RX ADMIN — FUROSEMIDE 80 MG: 10 INJECTION, SOLUTION INTRAMUSCULAR; INTRAVENOUS at 21:28

## 2023-12-30 RX ADMIN — MIDODRINE HYDROCHLORIDE 10 MG: 5 TABLET ORAL at 13:43

## 2023-12-30 RX ADMIN — ENOXAPARIN SODIUM 40 MG: 100 INJECTION SUBCUTANEOUS at 18:31

## 2023-12-30 RX ADMIN — DOCUSATE SODIUM 50 MG AND SENNOSIDES 8.6 MG 2 TABLET: 8.6; 5 TABLET, FILM COATED ORAL at 05:07

## 2023-12-30 RX ADMIN — ALBUTEROL SULFATE 2 PUFF: 90 AEROSOL, METERED RESPIRATORY (INHALATION) at 18:17

## 2023-12-30 RX ADMIN — ALBUTEROL SULFATE 2 PUFF: 90 AEROSOL, METERED RESPIRATORY (INHALATION) at 05:08

## 2023-12-30 RX ADMIN — LACTULOSE 45 ML: 20 SOLUTION ORAL at 05:08

## 2023-12-30 RX ADMIN — FUROSEMIDE 80 MG: 10 INJECTION, SOLUTION INTRAMUSCULAR; INTRAVENOUS at 13:43

## 2023-12-30 RX ADMIN — CEPHALEXIN 500 MG: 500 CAPSULE ORAL at 21:58

## 2023-12-30 RX ADMIN — RIFAXIMIN 550 MG: 550 TABLET ORAL at 05:08

## 2023-12-30 RX ADMIN — MIDODRINE HYDROCHLORIDE 10 MG: 5 TABLET ORAL at 05:08

## 2023-12-30 RX ADMIN — RIFAXIMIN 550 MG: 550 TABLET ORAL at 18:18

## 2023-12-30 RX ADMIN — CEFAZOLIN 2 G: 2 INJECTION, POWDER, FOR SOLUTION INTRAMUSCULAR; INTRAVENOUS at 05:08

## 2023-12-30 RX ADMIN — ALBUTEROL SULFATE 2 PUFF: 90 AEROSOL, METERED RESPIRATORY (INHALATION) at 21:31

## 2023-12-30 RX ADMIN — MIDODRINE HYDROCHLORIDE 10 MG: 5 TABLET ORAL at 21:26

## 2023-12-30 ASSESSMENT — ENCOUNTER SYMPTOMS
COUGH: 1
DIZZINESS: 0
BLURRED VISION: 0
BACK PAIN: 0
HEADACHES: 0
SORE THROAT: 0
VOMITING: 0
DOUBLE VISION: 0
DIARRHEA: 0
FEVER: 0
PALPITATIONS: 0
SHORTNESS OF BREATH: 1
LOSS OF CONSCIOUSNESS: 0
NAUSEA: 0
CHILLS: 0
ABDOMINAL PAIN: 0

## 2023-12-30 ASSESSMENT — PAIN DESCRIPTION - PAIN TYPE
TYPE: ACUTE PAIN

## 2023-12-30 NOTE — CARE PLAN
The patient is Stable - Low risk of patient condition declining or worsening    Shift Goals  Clinical Goals: labs, wean o2, mobility, abx  Patient Goals: rest  Family Goals: gisell    Progress made toward(s) clinical / shift goals:    Problem: Knowledge Deficit - Standard  Goal: Patient and family/care givers will demonstrate understanding of plan of care, disease process/condition, diagnostic tests and medications  Description: Target End Date:  1-3 days or as soon as patient condition allows    Document in Patient Education    1.  Patient and family/caregiver oriented to unit, equipment, visitation policy and means for communicating concern  2.  Complete/review Learning Assessment  3.  Assess knowledge level of disease process/condition, treatment plan, diagnostic tests and medications  4.  Explain disease process/condition, treatment plan, diagnostic tests and medications  Outcome: Progressing     Problem: Respiratory  Goal: Patient will achieve/maintain optimum respiratory ventilation and gas exchange  Description: Target End Date:  Prior to discharge or change in level of care    Document on Assessment flowsheet    1.  Assess and monitor rate, rhythm, depth and effort of respiration  2.  Breath sounds assessed qshift and/or as needed  3.  Assess O2 saturation, administer/titrate oxygen as ordered  4.  Position patient for maximum ventilatory efficiency  5.  Turn, cough, and deep breath with splinting to improve effectiveness  6.  Collaborate with RT to administer medication/treatments per order  7.  Encourage use of incentive spirometer and encourage patient to cough after use and utilize splinting techniques if applicable  8.  Airway suctioning  9.  Monitor sputum production for changes in color, consistency and frequency  10. Perform frequent oral hygiene  11. Alternate physical activity with rest periods  Outcome: Progressing       Patient is not progressing towards the following goals:

## 2023-12-30 NOTE — CARE PLAN
The patient is Stable - Low risk of patient condition declining or worsening    Shift Goals  Clinical Goals: labs, wean o2, mobility, abx  Patient Goals: rest  Family Goals: gisell    Progress made toward(s) clinical / shift goals:      Problem: Care Map:  Admission Optimal Outcome for the Heart Failure Patient  Goal: Admission:  Optimal Care of the heart failure patient  Outcome: Progressing     Problem: Care Map:  Day 1 Optimal Outcome for the Heart Failure Patient  Goal: Day 1:  Optimal Care of the heart failure patient  Outcome: Progressing     Problem: Care Map:  Day 2 Optimal Outcome for the Heart Failure Patient  Goal: Day 2:  Optimal Care of the heart failure patient  Outcome: Progressing     Problem: Care Map:  Day 3 Optimal Outcome for the Heart Failure Patient  Goal: Day 3:  Optimal Care of the heart failure patient  Outcome: Progressing     Problem: Care Map:  Day Before Discharge Optimal Outcome for the Heart Failure Patient  Goal: Day Before Discharge:  Optimal Care of the heart failure patient  Outcome: Progressing     Problem: Care Map:  Day of Discharge Optimal Outcome for the Heart Failure Patient  Goal: Day of Discharge:  Optimal Care of the heart failure patient  Outcome: Progressing     Problem: Knowledge Deficit - Standard  Goal: Patient and family/care givers will demonstrate understanding of plan of care, disease process/condition, diagnostic tests and medications  Outcome: Progressing     Problem: Psychosocial  Goal: Patient's level of anxiety will decrease  Outcome: Progressing  Goal: Patient's ability to verbalize feelings about condition will improve  Outcome: Progressing  Goal: Patient's ability to re-evaluate and adapt role responsibilities will improve  Outcome: Progressing  Goal: Patient and family will demonstrate ability to cope with life altering diagnosis and/or procedure  Outcome: Progressing  Goal: Spiritual and cultural needs incorporated into hospitalization  Outcome:  Progressing     Problem: Communication  Goal: The ability to communicate needs accurately and effectively will improve  Outcome: Progressing     Problem: Discharge Barriers/Planning  Goal: Patient's continuum of care needs are met  Outcome: Progressing     Problem: Hemodynamics  Goal: Patient's hemodynamics, fluid balance and neurologic status will be stable or improve  Outcome: Progressing     Problem: Respiratory  Goal: Patient will achieve/maintain optimum respiratory ventilation and gas exchange  Outcome: Progressing     Problem: Fluid Volume  Goal: Fluid volume balance will be maintained  Outcome: Progressing     Problem: Dysphagia  Goal: Dysphagia will improve  Outcome: Progressing     Problem: Risk for Aspiration  Goal: Patient's risk for aspiration will be absent or decrease  Outcome: Progressing     Problem: Nutrition  Goal: Patient's nutritional and fluid intake will be adequate or improve  Outcome: Progressing  Goal: Enteral nutrition will be maintained or improve  Outcome: Progressing  Goal: Enteral nutrition will be maintained or improve  Outcome: Progressing     Problem: Urinary Elimination  Goal: Establish and maintain regular urinary output  Outcome: Progressing     Problem: Bowel Elimination  Goal: Establish and maintain regular bowel function  Outcome: Progressing     Problem: Gastrointestinal Irritability  Goal: Nausea and vomiting will be absent or improve  Outcome: Progressing  Goal: Diarrhea will be absent or improved  Outcome: Progressing     Problem: Mobility  Goal: Patient's capacity to carry out activities will improve  Outcome: Progressing     Problem: Self Care  Goal: Patient will have the ability to perform ADLs independently or with assistance (bathe, groom, dress, toilet and feed)  Outcome: Not Progressing     Problem: Infection - Standard  Goal: Patient will remain free from infection  Outcome: Progressing     Problem: Wound/ / Incision Healing  Goal: Patient's wound/surgical incision  will decrease in size and heals properly  Outcome: Progressing     Problem: Pain - Standard  Goal: Alleviation of pain or a reduction in pain to the patient’s comfort goal  Outcome: Progressing     Problem: Skin Integrity  Goal: Skin integrity is maintained or improved  Outcome: Progressing     Problem: Fall Risk  Goal: Patient will remain free from falls  Outcome: Progressing       Patient is not progressing towards the following goals:      Problem: Self Care  Goal: Patient will have the ability to perform ADLs independently or with assistance (bathe, groom, dress, toilet and feed)  Outcome: Not Progressing

## 2023-12-30 NOTE — CARE PLAN
The patient is Stable - Low risk of patient condition declining or worsening    Shift Goals  Clinical Goals: monitor labs, O2 stability  Patient Goals: eat, sleep  Family Goals: gisell    Progress made toward(s) clinical / shift goals:    Problem: Knowledge Deficit - Standard  Goal: Patient and family/care givers will demonstrate understanding of plan of care, disease process/condition, diagnostic tests and medications  Description: Target End Date:  1-3 days or as soon as patient condition allows    Document in Patient Education    1.  Patient and family/caregiver oriented to unit, equipment, visitation policy and means for communicating concern  2.  Complete/review Learning Assessment  3.  Assess knowledge level of disease process/condition, treatment plan, diagnostic tests and medications  4.  Explain disease process/condition, treatment plan, diagnostic tests and medications  Outcome: Progressing     Problem: Communication  Goal: The ability to communicate needs accurately and effectively will improve  Description: Target End Date:  End of day 1    1.  Assess ability to communicate and understand  2.  Provide augmentative or alternative methods of communication devices  3.  Use /language line as appropriate  4.  Collaborate with Speech Therapy as needed  Outcome: Progressing     Problem: Urinary Elimination  Goal: Establish and maintain regular urinary output  Description: Target End Date:  Prior to discharge or change in level of care    Document on I/O and Assessment flowsheets    1.  Evaluate need to continue indwelling catheter every shift  2.  Assess signs and symptoms of urinary retention  3.  Assess post-void residual volumes  4.  Implement bladder training program  5.  Encourage scheduled voidings  6.  Assist patient to sit on bedside commode or toilet for voiding  7.  Educate patient and family/caregiver on use and purpose of urine collection devices (document in Patient Education)  Outcome:  Progressing     Problem: Mobility  Goal: Patient's capacity to carry out activities will improve  Description: Target End Date:  Prior to discharge or change in level of care    1.  Assess for barriers to mobility/activity  2.  Implement activity per interdisciplinary team recommendations  3.  Target activity level identified and patient/family/caregiver aware of goal  4.  Provide assistive devices  5.  Instruct patient/caregiver on proper use of assistive/adaptive devices  6.  Schedule activities and rest periods to decrease effects of fatigue  7.  Encourage mobilization to extent of ability  8.  Maintain proper body alignment  9.  Provide adequate pain management to allow progressive mobilization  10. Implement pace maker precautions as needed  Outcome: Progressing     Problem: Pain - Standard  Goal: Alleviation of pain or a reduction in pain to the patient’s comfort goal  Description: Target End Date:  Prior to discharge or change in level of care    Document on Vitals flowsheet    1.  Document pain using the appropriate pain scale per order or unit policy  2.  Educate and implement non-pharmacologic comfort measures (i.e. relaxation, distraction, massage, cold/heat therapy, etc.)  3.  Pain management medications as ordered  4.  Reassess pain after pain med administration per policy  5.  If opiods administered assess patient's response to pain medication is appropriate per POSS sedation scale  6.  Follow pain management plan developed in collaboration with patient and interdisciplinary team (including palliative care or pain specialists if applicable)  Outcome: Progressing     Problem: Pain - Standard  Goal: Alleviation of pain or a reduction in pain to the patient’s comfort goal  Description: Target End Date:  Prior to discharge or change in level of care    Document on Vitals flowsheet    1.  Document pain using the appropriate pain scale per order or unit policy  2.  Educate and implement non-pharmacologic  comfort measures (i.e. relaxation, distraction, massage, cold/heat therapy, etc.)  3.  Pain management medications as ordered  4.  Reassess pain after pain med administration per policy  5.  If opiods administered assess patient's response to pain medication is appropriate per POSS sedation scale  6.  Follow pain management plan developed in collaboration with patient and interdisciplinary team (including palliative care or pain specialists if applicable)  Outcome: Progressing     Problem: Skin Integrity  Goal: Skin integrity is maintained or improved  Description: Target End Date:  Prior to discharge or change in level of care    Document interventions on Skin Risk/Jose flowsheet groups and corresponding LDA    1.  Assess and monitor skin integrity, appearance and/or temperature  2.  Assess risk factors for impaired skin integrity and/or pressures ulcers  3.  Implement precautions to protect skin integrity in collaboration with interdisciplinary team  4.  Implement pressure ulcer prevention protocol if at risk for skin breakdown  5.  Confirm wound care consult if at risk for skin breakdown  6.  Ensure patient use of pressure relieving devices  (Low air loss bed, waffle overlay, heel protectors, ROHO cushion, etc)  Outcome: Progressing       Patient is not progressing towards the following goals:

## 2023-12-30 NOTE — PROGRESS NOTES
[facility] Medicine Daily Progress Note    Date of Service  12/30/2023    Chief Complaint  Yas Bernard is a 63 y.o. female admitted 12/28/2023 with altered    Hospital Course  62yo PMHx cirrhosis, COPD, PAH, DM, ascending aortic aneurysm, tobacco use, distant Hx of ETOH but now sober presenting with weakness.  In ED found to be hypoxic, hyperkalemic and in TARUN.  Paracentesis of 4 L done.  Transferred to ICU on 12/28 with hypercarbic resp failure on BiPAP    Interval Problem Update  Feels tired but is otherwise feeling in her normal state of health.  Denies abd pain    AFebrile  UOP 3955ml/24hrs on lasix 80mg IV TID  Sinus 50s  -120s  On 4 LNC while awake, BiPAP when sleeping    I have discussed this patient's plan of care and discharge plan at IDT rounds today with Case Management, Nursing, Nursing leadership, and other members of the IDT team.    Consultants/Specialty      Code Status  Full Code    Disposition  Medically Cleared  I have placed the appropriate orders for post-discharge needs.    Review of Systems  Review of Systems   Constitutional:  Positive for malaise/fatigue. Negative for chills and fever.   HENT:  Negative for nosebleeds and sore throat.    Eyes:  Negative for blurred vision and double vision.   Respiratory:  Positive for cough and shortness of breath.    Cardiovascular:  Negative for chest pain, palpitations and leg swelling.   Gastrointestinal:  Negative for abdominal pain, diarrhea, nausea and vomiting.   Genitourinary:  Negative for dysuria and urgency.   Musculoskeletal:  Negative for back pain.   Skin:  Negative for rash.   Neurological:  Negative for dizziness, loss of consciousness and headaches.        Physical Exam  Temp:  [36.2 °C (97.2 °F)-36.7 °C (98.1 °F)] 36.3 °C (97.4 °F)  Pulse:  [55-76] 59  Resp:  [14-65] 23  BP: (104-127)/(56-65) 108/58  SpO2:  [89 %-97 %] 95 %    Physical Exam  Constitutional:       General: She is not in acute distress.     Appearance: Normal  appearance. She is well-developed. She is not diaphoretic.   HENT:      Head: Normocephalic and atraumatic.   Neck:      Vascular: No JVD.   Cardiovascular:      Rate and Rhythm: Normal rate and regular rhythm.      Heart sounds: No murmur heard.  Pulmonary:      Effort: Pulmonary effort is normal. No respiratory distress.      Breath sounds: No stridor. No wheezing or rales.   Abdominal:      General: There is distension.      Palpations: Abdomen is soft.      Tenderness: There is no abdominal tenderness. There is no guarding or rebound.      Comments: =FLUID WAVE   Musculoskeletal:         General: No tenderness.      Right lower leg: No edema.      Left lower leg: No edema.   Skin:     General: Skin is warm and dry.      Capillary Refill: Capillary refill takes less than 2 seconds.      Findings: No rash.      Comments: B LOWER LEG ERYTHEMA   Neurological:      Mental Status: She is oriented to person, place, and time.   Psychiatric:         Mood and Affect: Mood normal.         Behavior: Behavior normal.         Thought Content: Thought content normal.         Fluids    Intake/Output Summary (Last 24 hours) at 12/30/2023 1430  Last data filed at 12/30/2023 1200  Gross per 24 hour   Intake 711.67 ml   Output 3800 ml   Net -3088.33 ml         Laboratory  Recent Labs     12/28/23  0034 12/29/23 0148 12/30/23  0123   WBC 9.5 9.7 8.7   RBC 4.88 4.60 4.53   HEMOGLOBIN 16.7* 15.7 15.3   HEMATOCRIT 53.0* 48.5* 46.7   .6* 105.4* 103.1*   MCH 34.2* 34.1* 33.8*   MCHC 31.5* 32.4 32.8   RDW 61.7* 59.5* 56.7*   PLATELETCT 180 162* 151*   MPV 10.4 10.4 10.0       Recent Labs     12/28/23 2020 12/29/23  0148 12/30/23  0123   SODIUM 138 136 135   POTASSIUM 5.3 5.1 4.6   CHLORIDE 101 98 96   CO2 25 26 28   GLUCOSE 115* 121* 113*   BUN 51* 55* 45*   CREATININE 1.39 1.39 1.30   CALCIUM 8.6 8.7 8.5       Recent Labs     12/27/23  2130 12/28/23  0034   INR 1.62 1.47*                 Imaging  US-EXTREMITY VENOUS LOWER BILAT    Final Result      CT-HEAD W/O   Final Result      No acute process.         US-RENAL   Final Result      Normal exam.      DX-CHEST-LIMITED (1 VIEW)   Final Result         No pulmonary infiltrates or consolidations are noted.      Cardiomegaly.      EC-ECHOCARDIOGRAM COMPLETE W/O CONT    (Results Pending)        Assessment/Plan  Type 2 diabetes mellitus (HCC)  Assessment & Plan  A1c 6.4  Given ESLD and concern for nutritional status would prefer not to restrict her diet    Cellulitis of left lower extremity  Assessment & Plan  Blood cultures neg  cefazolin    Acute on chronic respiratory failure with hypoxia and hypercapnia (HCC)- (present on admission)  Assessment & Plan  History of COPD though not on home oxygen.  She was told she needed to be on it, and in fact had it at home, but insurance stopped covering it and she did not have the money to pay for it herself.  Continue diuresis: Currently on Lasix 80 mg 3 times daily.  Follow daily BMP  O2 and RT protocols  Mobilize  Encourage smoking cessation    Encephalopathy acute  Assessment & Plan  Acute HE now resolved  Lactulose  rifaximin    Hyperkalemia  Assessment & Plan  resolved    Acute kidney injury (HCC)  Assessment & Plan  Per chart review has normal baseline kidney function  Patient has been taking NSAIDs prior to her presentation  Has responded to paracentesis, and initiation of loop diuretic.  Continue to follow daily BMP and urine output      Elevated troponin  Assessment & Plan  Clinically looks like demand ischemia  Trop marginally elevated   TTE pending  EKG reassuring  Cont Tele  No CP or apparent CP equivalent    Acute respiratory failure with hypoxia  Assessment & Plan  At this time she requires 5 to 8 L oxygen mask.  History of COPD, currently not in exacerbation  Etiology may include hepatopulmonary syndrome, congestive heart failure, pneumonia  Chest x-ray showed hazy bilateral opacities  BNP is elevated at 13,000  Plan to continue forced  diuresis IV Lasix 60 mg twice daily  Obtain transthoracic echo, D-dimer  Monitor daily weight, input and output      COPD (chronic obstructive pulmonary disease) (HCC)- (present on admission)  Assessment & Plan  Not in exacerbation  Albuterol as needed  Supplemental oxygen    Ascites- (present on admission)  Assessment & Plan  Secondary to known liver cirrhosis  Status post evacuation of at least 4 L in ER of transparent  brown liquid  Now back on IV Lasix  Add Aldactone once done with acute diuresis           VTE prophylaxis:    enoxaparin ppx      I have performed a physical exam and reviewed and updated ROS and Plan today (12/30/2023). In review of yesterday's note (12/29/2023), there are no changes except as documented above.

## 2023-12-30 NOTE — ASSESSMENT & PLAN NOTE
History of COPD though not on home oxygen.  She was told she needed to be on it, and in fact had it at home, but insurance stopped covering it and she did not have the money to pay for it herself.  Continue diuresis: Currently on Lasix 80 mg 3 times daily.    12/31:  Decreased lasix 80 IV tid to 40 IV bid.  O2 and RT protocols  Mobilize  Encourage smoking cessation  No prior home oxygen.    Home o2 ordered

## 2023-12-31 LAB
ANION GAP SERPL CALC-SCNC: 13 MMOL/L (ref 7–16)
BACTERIA FLD AEROBE CULT: NORMAL
BUN SERPL-MCNC: 37 MG/DL (ref 8–22)
CALCIUM SERPL-MCNC: 8.7 MG/DL (ref 8.5–10.5)
CHLORIDE SERPL-SCNC: 91 MMOL/L (ref 96–112)
CO2 SERPL-SCNC: 29 MMOL/L (ref 20–33)
CREAT SERPL-MCNC: 1.1 MG/DL (ref 0.5–1.4)
GFR SERPLBLD CREATININE-BSD FMLA CKD-EPI: 56 ML/MIN/1.73 M 2
GLUCOSE SERPL-MCNC: 89 MG/DL (ref 65–99)
GRAM STN SPEC: NORMAL
POTASSIUM SERPL-SCNC: 4.6 MMOL/L (ref 3.6–5.5)
SIGNIFICANT IND 70042: NORMAL
SITE SITE: NORMAL
SODIUM SERPL-SCNC: 133 MMOL/L (ref 135–145)
SOURCE SOURCE: NORMAL

## 2023-12-31 PROCEDURE — 700102 HCHG RX REV CODE 250 W/ 637 OVERRIDE(OP): Performed by: HOSPITALIST

## 2023-12-31 PROCEDURE — 80048 BASIC METABOLIC PNL TOTAL CA: CPT

## 2023-12-31 PROCEDURE — 700111 HCHG RX REV CODE 636 W/ 250 OVERRIDE (IP): Mod: JZ | Performed by: INTERNAL MEDICINE

## 2023-12-31 PROCEDURE — 99406 BEHAV CHNG SMOKING 3-10 MIN: CPT

## 2023-12-31 PROCEDURE — 700111 HCHG RX REV CODE 636 W/ 250 OVERRIDE (IP): Mod: JZ | Performed by: HOSPITALIST

## 2023-12-31 PROCEDURE — 94664 DEMO&/EVAL PT USE INHALER: CPT

## 2023-12-31 PROCEDURE — A9270 NON-COVERED ITEM OR SERVICE: HCPCS

## 2023-12-31 PROCEDURE — 700102 HCHG RX REV CODE 250 W/ 637 OVERRIDE(OP): Performed by: INTERNAL MEDICINE

## 2023-12-31 PROCEDURE — A9270 NON-COVERED ITEM OR SERVICE: HCPCS | Performed by: INTERNAL MEDICINE

## 2023-12-31 PROCEDURE — A9270 NON-COVERED ITEM OR SERVICE: HCPCS | Performed by: HOSPITALIST

## 2023-12-31 PROCEDURE — 36415 COLL VENOUS BLD VENIPUNCTURE: CPT

## 2023-12-31 PROCEDURE — 99233 SBSQ HOSP IP/OBS HIGH 50: CPT | Performed by: HOSPITALIST

## 2023-12-31 PROCEDURE — 770006 HCHG ROOM/CARE - MED/SURG/GYN SEMI*

## 2023-12-31 PROCEDURE — 700102 HCHG RX REV CODE 250 W/ 637 OVERRIDE(OP)

## 2023-12-31 PROCEDURE — 94640 AIRWAY INHALATION TREATMENT: CPT

## 2023-12-31 RX ORDER — FUROSEMIDE 10 MG/ML
80 INJECTION INTRAMUSCULAR; INTRAVENOUS
Status: DISCONTINUED | OUTPATIENT
Start: 2024-01-01 | End: 2023-12-31

## 2023-12-31 RX ORDER — FUROSEMIDE 10 MG/ML
40 INJECTION INTRAMUSCULAR; INTRAVENOUS
Status: DISCONTINUED | OUTPATIENT
Start: 2024-01-01 | End: 2024-01-01

## 2023-12-31 RX ORDER — LANOLIN ALCOHOL/MO/W.PET/CERES
CREAM (GRAM) TOPICAL 2 TIMES DAILY
Status: DISCONTINUED | OUTPATIENT
Start: 2023-12-31 | End: 2024-01-03 | Stop reason: HOSPADM

## 2023-12-31 RX ADMIN — LACTULOSE 45 ML: 20 SOLUTION ORAL at 05:21

## 2023-12-31 RX ADMIN — MIDODRINE HYDROCHLORIDE 10 MG: 5 TABLET ORAL at 15:46

## 2023-12-31 RX ADMIN — MOMETASONE FUROATE AND FORMOTEROL FUMARATE DIHYDRATE 2 PUFF: 200; 5 AEROSOL RESPIRATORY (INHALATION) at 19:00

## 2023-12-31 RX ADMIN — ALBUTEROL SULFATE 2 PUFF: 90 AEROSOL, METERED RESPIRATORY (INHALATION) at 13:04

## 2023-12-31 RX ADMIN — ALBUTEROL SULFATE 2 PUFF: 90 AEROSOL, METERED RESPIRATORY (INHALATION) at 17:01

## 2023-12-31 RX ADMIN — MIDODRINE HYDROCHLORIDE 10 MG: 5 TABLET ORAL at 05:20

## 2023-12-31 RX ADMIN — ALBUTEROL SULFATE 2 PUFF: 90 AEROSOL, METERED RESPIRATORY (INHALATION) at 05:28

## 2023-12-31 RX ADMIN — RIFAXIMIN 550 MG: 550 TABLET ORAL at 05:20

## 2023-12-31 RX ADMIN — CEPHALEXIN 500 MG: 500 CAPSULE ORAL at 23:32

## 2023-12-31 RX ADMIN — CEPHALEXIN 500 MG: 500 CAPSULE ORAL at 13:03

## 2023-12-31 RX ADMIN — FUROSEMIDE 80 MG: 10 INJECTION, SOLUTION INTRAMUSCULAR; INTRAVENOUS at 05:28

## 2023-12-31 RX ADMIN — CEPHALEXIN 500 MG: 500 CAPSULE ORAL at 17:00

## 2023-12-31 RX ADMIN — RIFAXIMIN 550 MG: 550 TABLET ORAL at 18:00

## 2023-12-31 RX ADMIN — MIDODRINE HYDROCHLORIDE 10 MG: 5 TABLET ORAL at 21:25

## 2023-12-31 RX ADMIN — SKIN PROTECTANT: 33 OINTMENT TOPICAL at 18:00

## 2023-12-31 RX ADMIN — ALBUTEROL SULFATE 2 PUFF: 90 AEROSOL, METERED RESPIRATORY (INHALATION) at 21:21

## 2023-12-31 RX ADMIN — ENOXAPARIN SODIUM 40 MG: 100 INJECTION SUBCUTANEOUS at 17:00

## 2023-12-31 RX ADMIN — CEPHALEXIN 500 MG: 500 CAPSULE ORAL at 05:20

## 2023-12-31 ASSESSMENT — ENCOUNTER SYMPTOMS
SORE THROAT: 0
VOMITING: 0
DOUBLE VISION: 0
NAUSEA: 0
FEVER: 0
DIARRHEA: 0
PALPITATIONS: 0
DIZZINESS: 0
CHILLS: 0
BLURRED VISION: 0
COUGH: 1
SHORTNESS OF BREATH: 1
ABDOMINAL PAIN: 0
BACK PAIN: 0
HEADACHES: 0
LOSS OF CONSCIOUSNESS: 0

## 2023-12-31 ASSESSMENT — LIFESTYLE VARIABLES: PACK_YEARS: 20 PLUS

## 2023-12-31 ASSESSMENT — COPD QUESTIONNAIRES
IN THE PAST 12 MONTHS DO YOU DO LESS THAN YOU USED TO BECAUSE OF YOUR BREATHING PROBLEMS: AGREE
DO YOU EVER COUGH UP ANY MUCUS OR PHLEGM?: YES, A FEW DAYS A WEEK OR MONTH
DURING THE PAST 4 WEEKS HOW MUCH DID YOU FEEL SHORT OF BREATH: SOME OF THE TIME
HAVE YOU SMOKED AT LEAST 100 CIGARETTES IN YOUR ENTIRE LIFE: YES
COPD SCREENING SCORE: 7

## 2023-12-31 ASSESSMENT — PAIN DESCRIPTION - PAIN TYPE
TYPE: ACUTE PAIN

## 2023-12-31 NOTE — PROGRESS NOTES
Hospital Medicine Daily Progress Note    Date of Service  12/31/2023    Chief Complaint  Yas Bernard is a 63 y.o. 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  In ED found to be hypoxic, hyperkalemic and in TARUN.  Paracentesis of 4 L done.  Transferred to ICU on 12/28 with hypercarbic resp failure on BiPAP    Interval Problem Update  12/31: Patient seen on medical unit.  She has diffuse dry skin in multiple areas of abrasions from scratching.  Added Eucerin cream twice daily decrease Lasix 40 IV every 12.  Added fluid restrictions and salt restrictions.  Echo EF 60 to 65%.  Patient remains on 3 L/min nasal cannula.  Creatinine stable.  Lungs clear to auscultation bilaterally.  Added Dulera inhaler daily. Check RUQ u/s.    I have discussed this patient's plan of care and discharge plan at IDT rounds today with Case Management, Nursing, Nursing leadership, and other members of the IDT team.    Consultants/Specialty      Code Status  Full Code    Disposition    Anticipate discharge to: home with close outpatient follow-up    I have placed the appropriate orders for post-discharge needs.    Review of Systems  Review of Systems   Constitutional:  Positive for malaise/fatigue. Negative for chills and fever.   HENT:  Negative for nosebleeds and sore throat.    Eyes:  Negative for blurred vision and double vision.   Respiratory:  Positive for cough and shortness of breath.    Cardiovascular:  Negative for chest pain, palpitations and leg swelling.   Gastrointestinal:  Negative for abdominal pain, diarrhea, nausea and vomiting.   Genitourinary:  Negative for dysuria and urgency.   Musculoskeletal:  Negative for back pain.   Skin:  Negative for rash.   Neurological:  Negative for dizziness, loss of consciousness and headaches.        Physical Exam  Temp:  [36.4 °C  (97.5 °F)-36.8 °C (98.2 °F)] 36.7 °C (98.1 °F)  Pulse:  [58-64] 58  Resp:  [17-20] 18  BP: (100-126)/(53-75) 107/61  SpO2:  [90 %-94 %] 92 %    Physical Exam  Constitutional:       General: She is not in acute distress.     Appearance: Normal appearance. She is well-developed. She is not diaphoretic.   HENT:      Head: Normocephalic and atraumatic.   Neck:      Vascular: No JVD.   Cardiovascular:      Rate and Rhythm: Normal rate and regular rhythm.      Heart sounds: No murmur heard.  Pulmonary:      Effort: Pulmonary effort is normal. No respiratory distress.      Breath sounds: No stridor. No wheezing or rales.   Abdominal:      General: There is distension.      Palpations: Abdomen is soft.      Tenderness: There is no abdominal tenderness. There is no guarding or rebound.      Comments: =FLUID WAVE   Musculoskeletal:         General: No tenderness.      Right lower leg: No edema.      Left lower leg: No edema.   Skin:     General: Skin is warm and dry.      Capillary Refill: Capillary refill takes less than 2 seconds.      Findings: No rash.      Comments: B LOWER LEG ERYTHEMA   Neurological:      Mental Status: She is oriented to person, place, and time.   Psychiatric:         Mood and Affect: Mood normal.         Behavior: Behavior normal.         Thought Content: Thought content normal.         Fluids    Intake/Output Summary (Last 24 hours) at 12/31/2023 1601  Last data filed at 12/30/2023 1935  Gross per 24 hour   Intake 240 ml   Output --   Net 240 ml       Laboratory  Recent Labs     12/29/23 0148 12/30/23 0123   WBC 9.7 8.7   RBC 4.60 4.53   HEMOGLOBIN 15.7 15.3   HEMATOCRIT 48.5* 46.7   .4* 103.1*   MCH 34.1* 33.8*   MCHC 32.4 32.8   RDW 59.5* 56.7*   PLATELETCT 162* 151*   MPV 10.4 10.0     Recent Labs     12/29/23 0148 12/30/23  0123 12/31/23  0314   SODIUM 136 135 133*   POTASSIUM 5.1 4.6 4.6   CHLORIDE 98 96 91*   CO2 26 28 29   GLUCOSE 121* 113* 89   BUN 55* 45* 37*   CREATININE 1.39  1.30 1.10   CALCIUM 8.7 8.5 8.7                     Imaging  EC-ECHOCARDIOGRAM COMPLETE W/O CONT   Final Result      US-EXTREMITY VENOUS LOWER BILAT   Final Result      CT-HEAD W/O   Final Result      No acute process.         US-RENAL   Final Result      Normal exam.      DX-CHEST-LIMITED (1 VIEW)   Final Result         No pulmonary infiltrates or consolidations are noted.      Cardiomegaly.      US-RUQ    (Results Pending)        Assessment/Plan  Type 2 diabetes mellitus (HCC)- (present on admission)  Assessment & Plan  A1c 6.4  Given ESLD and concern for nutritional status would prefer not to restrict her diet    Cellulitis of left lower extremity- (present on admission)  Assessment & Plan  Blood cultures neg  DC cefazolin and start keflex    Cirrhosis (HCC)- (present on admission)  Assessment & Plan  Check RUQ u/s.  Acute onset of ascites, worsening edema, though sober x 8 years.    Acute on chronic respiratory failure with hypoxia and hypercapnia (HCC)- (present on admission)  Assessment & Plan  History of COPD though not on home oxygen.  She was told she needed to be on it, and in fact had it at home, but insurance stopped covering it and she did not have the money to pay for it herself.  Continue diuresis: Currently on Lasix 80 mg 3 times daily.  Follow daily BMP  O2 and RT protocols  Mobilize  Encourage smoking cessation    Encephalopathy acute- (present on admission)  Assessment & Plan  Acute HE now resolved  Lactulose  rifaximin    Hyperkalemia- (present on admission)  Assessment & Plan  resolved    Acute kidney injury (HCC)  Assessment & Plan  Per chart review has normal baseline kidney function  Patient has been taking NSAIDs prior to her presentation  Has responded to paracentesis, and initiation of loop diuretic.  Continue to follow daily BMP and urine output      Elevated troponin- (present on admission)  Assessment & Plan  Clinically looks like demand ischemia  Trop marginally elevated   TTE  pending  EKG reassuring  Cont Tele  No CP or apparent CP equivalent    Acute respiratory failure with hypoxia  Assessment & Plan  At this time she requires 5 to 8 L oxygen mask.  History of COPD, currently not in exacerbation  Etiology may include hepatopulmonary syndrome, congestive heart failure, pneumonia  Chest x-ray showed hazy bilateral opacities  BNP is elevated at 13,000  Plan to continue forced diuresis IV Lasix 60 mg twice daily  Obtain transthoracic echo, D-dimer  Monitor daily weight, input and output      COPD (chronic obstructive pulmonary disease) (HCC)- (present on admission)  Assessment & Plan  Not in exacerbation  Albuterol as needed  Supplemental oxygen    Ascites- (present on admission)  Assessment & Plan  Secondary to known liver cirrhosis  Status post evacuation of at least 4 L in ER of transparent  brown liquid  Now back on IV Lasix  Add Aldactone once done with acute diuresis           VTE prophylaxis:    enoxaparin ppx      I have performed a physical exam and reviewed and updated ROS and Plan today (12/31/2023). In review of yesterday's note (12/30/2023), there are no changes except as documented above.

## 2023-12-31 NOTE — PROGRESS NOTES
Rec'd report from day shift RN. Assumed pt care. Assessment completed. AA&OX4. Denies pain at this time. No s/s of discomfort or distress. Pt is sitting at EOB, ambulates to BSC, maintains steady gait, SBA. Pt has a strong dry cough. BLE are rufino, taut (hard skin). Bed in lowest position, bed locked, bed alarm on for safety, treaded socks in place, RN and CNA numbers provided, call light within reach.

## 2023-12-31 NOTE — PROGRESS NOTES
4 Eyes Skin Assessment Completed by Zoë OLIVAREZ RN and Elizabeth MORALES RN.    Head WDL  Ears WDL  Nose WDL  Mouth WDL  Neck WDL  Breast/Chest Redness, Blanching, and Scab  Shoulder Blades Redness and Blanching, Scars  Spine Redness and Blanching  (R) Arm/Elbow/Hand Redness, Blanching, Bruising, and Scab  (L) Arm/Elbow/Hand Redness, Blanching, and Scab  Abdomen WDL  Groin Redness, Blanching, and Excoriation  Scrotum/Coccyx/Buttocks Redness and Blanching  (R) Leg Redness and Shiny  (L) Leg Redness and Shiny  (R) Heel/Foot/Toe Redness and Blanching  (L) Heel/Foot/Toe Redness and Blanching          Devices In Places Pulse Ox and Nasal Cannula      Interventions In Place Gray Ear Foams, NC W/Ear Foams, Pillows, and Heels Loaded W/Pillows    Possible Skin Injury No    Pictures Uploaded Into Epic N/A  Wound Consult Placed N/A  RN Wound Prevention Protocol Ordered No

## 2023-12-31 NOTE — RESPIRATORY CARE
COPD EDUCATION by COPD CLINICAL EDUCATOR  12/31/2023  at  11:48 AM by Sandra Rizo RRT     Patient interviewed by education team.  Patient declined or is unable to participate in the full program.  Therefore, a short intervention has been conducted.  A comprehensive packet including information about COPD, types of treatments to manage their disease and safe home Oxygen usage was provided and reviewed with patient at the bedside.  Spacer was given to patient with education.       COPD Screen  COPD Risk Screening  Do you have a history of COPD?: Yes  Do you have a Pulmonologist?: No  COPD Population Screener  During the past 4 weeks, how much did you feel short of breath?: Some of the time  Do you ever cough up any mucus or phlegm?: Yes, a few days a week or month  In the past 12 months, you do less than you used to because of your breathing problems: Agree  Have you smoked at least 100 cigarettes in your entire life?: Yes  How old are you?: 60+  COPD Screening Score: 7  COPD Coordinator Recommended: Yes    COPD Assessment  COPD Clinical Specialists ONLY  COPD Education Initiated: Yes--Short Intervention (Pt  has no pft to view, note states obstrutive/restrictive combo with good bronchodialator response. takes symbicort and albuterol at home. No home oxygen no pulm or primary at this time, COPD booklet and spacer given. Quit smoking unsure of year)  Is this a COPD exacerbation patient?: No  DME Company: none at this time  DME Equipment Type: none at this time  Physician Follow Up Appointment:  (none at this time)  Physician Name: none at this time  Pulmonary Follow Up Appointment:  (none at this time)  Pulmonologist Name: none  Referrals Initiated: Yes  Pulmonary Rehab: N/A  Smoking Cessation: Yes  $ Smoking Cessation 3-10 Minutes: Asymptomatic (confirmed pk years 20 plus, can not remember quit date, a few years ago at least.)  Smoking Pack Years: 20 plus  Hospice: N/A  Home Health Care: N/A  Mobile Urgent Care  "Services: N/A  Geriatric Specialty Group: N/A  Private In-Home Care Agency: N/A  $ Demo/Eval of SVN's, MDI's and Aerosols: Yes (home medications were reviewed, COPD booklet and spacer were reviewed and given to patient.)  Interdisciplinary Rounds: Attendance at Rounds (30 Min)    PFT Results    No data to view, physician note states obstructive with restrictive, good bronchodilator response.     Meds to Beds  [facility] provides bedside medication delivery for all eligible patients at discharge.  Would you like to opt out of this program for any reason?: No - Stay Opted In     MY COPD ACTION PLAN     It is recommended that patients and physicians /healthcare providers complete this action plan together. This plan should be discussed at each physician visit and updated as needed.    The green, yellow and red zones show groups of symptoms of COPD. This list of symptoms is not comprehensive, and you may experience other symptoms. In the \"Actions\" column, your healthcare provider has recommended actions for you to take based on your symptoms.    Patient Name: Yas Bernard   YOB: 1960   Last Updated on: 12/31/2023 11:44 AM   Green Zone:  I am doing well today Actions     Usual activitiy and exercise level   Take daily medications     Usual amounts of cough and phlegm/mucus   Use oxygen as prescribed     Sleep well at night   Continue regular exercise/diet plan     Appetite is good   At all times avoid cigarette smoke, inhaled irritants     Daily Medications (these medications are taken every day):   Budesonide-Formoterol Fumarate (Symbicort) 2 Puffs Twice daily     Additional Information:  Use as directed with spacer, rinse mouth well with water and spit after every use.     Yellow Zone:  I am having a bad day or a COPD flare Actions     More breathless than usual   Continue daily medications     I have less energy for my daily activities   Use quick relief inhaler as ordered     Increased or thicker " "phlegm/mucus   Use oxygen as prescribed     Using quick relief inhaler/nebulizer more often   Get plenty of rest     Swelling of ankles more than usual   Use pursed lip breathing     More coughing than usual   At all times avoid cigarette smoke, inhaled irritants     I feel like I have a \"chest cold\"     Poor sleep and my symptoms woke me up     My appetite is not good     My medicine is not helping      Call provider immediately if symptoms don’t improve     Continue daily medications, add rescue medications:   Albuterol 2 Puffs Every 6 hours PRN       Medications to be used during a flare up, (as Discussed with Provider):           Additional Information:  Use with spacer, 2 puffs, 1 puff at a time waiting 1-2 minutes in between puffs.     Red Zone:  I need urgent medical care Actions     Severe shortness of breath even at rest   Call 911 or seek medical care immediately     Not able to do any activity because of breathing      Fever or shaking chills      Feeling confused or very drowsy       Chest pains      Coughing up blood                  "

## 2023-12-31 NOTE — CARE PLAN
The patient is Stable - Low risk of patient condition declining or worsening    Shift Goals  Clinical Goals: Pt will call appropriately by using call light when needing to void during the night in order to remain safe and prevent falls.  Patient Goals: Sleep      Progress made toward(s) clinical / shift goals:      Patient is not progressing towards the following goals:

## 2024-01-01 ENCOUNTER — APPOINTMENT (OUTPATIENT)
Dept: RADIOLOGY | Facility: REHABILITATION | Age: 64
DRG: 441 | End: 2024-01-01
Attending: HOSPITALIST
Payer: MEDICAID

## 2024-01-01 ENCOUNTER — APPOINTMENT (OUTPATIENT)
Dept: RADIOLOGY | Facility: MEDICAL CENTER | Age: 64
DRG: 441 | End: 2024-01-01
Attending: HOSPITALIST
Payer: MEDICAID

## 2024-01-01 LAB
ANION GAP SERPL CALC-SCNC: 14 MMOL/L (ref 7–16)
BASOPHILS # BLD AUTO: 0.8 % (ref 0–1.8)
BASOPHILS # BLD: 0.06 K/UL (ref 0–0.12)
BUN SERPL-MCNC: 30 MG/DL (ref 8–22)
CALCIUM SERPL-MCNC: 8.9 MG/DL (ref 8.5–10.5)
CHLORIDE SERPL-SCNC: 91 MMOL/L (ref 96–112)
CO2 SERPL-SCNC: 29 MMOL/L (ref 20–33)
CREAT SERPL-MCNC: 0.76 MG/DL (ref 0.5–1.4)
EOSINOPHIL # BLD AUTO: 0.33 K/UL (ref 0–0.51)
EOSINOPHIL NFR BLD: 4.3 % (ref 0–6.9)
ERYTHROCYTE [DISTWIDTH] IN BLOOD BY AUTOMATED COUNT: 56.5 FL (ref 35.9–50)
GFR SERPLBLD CREATININE-BSD FMLA CKD-EPI: 88 ML/MIN/1.73 M 2
GLUCOSE SERPL-MCNC: 88 MG/DL (ref 65–99)
HCT VFR BLD AUTO: 50.1 % (ref 37–47)
HGB BLD-MCNC: 16.4 G/DL (ref 12–16)
IMM GRANULOCYTES # BLD AUTO: 0.04 K/UL (ref 0–0.11)
IMM GRANULOCYTES NFR BLD AUTO: 0.5 % (ref 0–0.9)
LYMPHOCYTES # BLD AUTO: 1.07 K/UL (ref 1–4.8)
LYMPHOCYTES NFR BLD: 14.1 % (ref 22–41)
MCH RBC QN AUTO: 33.4 PG (ref 27–33)
MCHC RBC AUTO-ENTMCNC: 32.7 G/DL (ref 32.2–35.5)
MCV RBC AUTO: 102 FL (ref 81.4–97.8)
MONOCYTES # BLD AUTO: 1.24 K/UL (ref 0–0.85)
MONOCYTES NFR BLD AUTO: 16.3 % (ref 0–13.4)
NEUTROPHILS # BLD AUTO: 4.85 K/UL (ref 1.82–7.42)
NEUTROPHILS NFR BLD: 64 % (ref 44–72)
NRBC # BLD AUTO: 0 K/UL
NRBC BLD-RTO: 0 /100 WBC (ref 0–0.2)
PLATELET # BLD AUTO: 142 K/UL (ref 164–446)
PMV BLD AUTO: 9.8 FL (ref 9–12.9)
POTASSIUM SERPL-SCNC: 4.2 MMOL/L (ref 3.6–5.5)
RBC # BLD AUTO: 4.91 M/UL (ref 4.2–5.4)
SODIUM SERPL-SCNC: 134 MMOL/L (ref 135–145)
WBC # BLD AUTO: 7.6 K/UL (ref 4.8–10.8)

## 2024-01-01 PROCEDURE — A9270 NON-COVERED ITEM OR SERVICE: HCPCS

## 2024-01-01 PROCEDURE — 700102 HCHG RX REV CODE 250 W/ 637 OVERRIDE(OP): Performed by: INTERNAL MEDICINE

## 2024-01-01 PROCEDURE — 700111 HCHG RX REV CODE 636 W/ 250 OVERRIDE (IP): Mod: JZ | Performed by: HOSPITALIST

## 2024-01-01 PROCEDURE — 85025 COMPLETE CBC W/AUTO DIFF WBC: CPT

## 2024-01-01 PROCEDURE — A9270 NON-COVERED ITEM OR SERVICE: HCPCS | Performed by: HOSPITALIST

## 2024-01-01 PROCEDURE — 700102 HCHG RX REV CODE 250 W/ 637 OVERRIDE(OP)

## 2024-01-01 PROCEDURE — A9270 NON-COVERED ITEM OR SERVICE: HCPCS | Performed by: INTERNAL MEDICINE

## 2024-01-01 PROCEDURE — 700102 HCHG RX REV CODE 250 W/ 637 OVERRIDE(OP): Performed by: HOSPITALIST

## 2024-01-01 PROCEDURE — 97116 GAIT TRAINING THERAPY: CPT

## 2024-01-01 PROCEDURE — 80048 BASIC METABOLIC PNL TOTAL CA: CPT

## 2024-01-01 PROCEDURE — 36415 COLL VENOUS BLD VENIPUNCTURE: CPT

## 2024-01-01 PROCEDURE — 97161 PT EVAL LOW COMPLEX 20 MIN: CPT

## 2024-01-01 PROCEDURE — 770006 HCHG ROOM/CARE - MED/SURG/GYN SEMI*

## 2024-01-01 PROCEDURE — 99233 SBSQ HOSP IP/OBS HIGH 50: CPT | Performed by: HOSPITALIST

## 2024-01-01 RX ORDER — FUROSEMIDE 10 MG/ML
40 INJECTION INTRAMUSCULAR; INTRAVENOUS
Status: DISCONTINUED | OUTPATIENT
Start: 2024-01-02 | End: 2024-01-02

## 2024-01-01 RX ORDER — DIPHENHYDRAMINE HCL 25 MG
25 TABLET ORAL EVERY 6 HOURS PRN
Status: DISCONTINUED | OUTPATIENT
Start: 2024-01-01 | End: 2024-01-03 | Stop reason: HOSPADM

## 2024-01-01 RX ORDER — GABAPENTIN 300 MG/1
600 CAPSULE ORAL EVERY EVENING
Status: DISCONTINUED | OUTPATIENT
Start: 2024-01-01 | End: 2024-01-03 | Stop reason: HOSPADM

## 2024-01-01 RX ADMIN — SKIN PROTECTANT: 33 OINTMENT TOPICAL at 17:51

## 2024-01-01 RX ADMIN — CEPHALEXIN 500 MG: 500 CAPSULE ORAL at 23:31

## 2024-01-01 RX ADMIN — FUROSEMIDE 40 MG: 10 INJECTION, SOLUTION INTRAVENOUS at 05:28

## 2024-01-01 RX ADMIN — LACTULOSE 45 ML: 20 SOLUTION ORAL at 05:27

## 2024-01-01 RX ADMIN — RIFAXIMIN 550 MG: 550 TABLET ORAL at 05:26

## 2024-01-01 RX ADMIN — SKIN PROTECTANT: 33 OINTMENT TOPICAL at 05:30

## 2024-01-01 RX ADMIN — ALBUTEROL SULFATE 2 PUFF: 90 AEROSOL, METERED RESPIRATORY (INHALATION) at 18:00

## 2024-01-01 RX ADMIN — MIDODRINE HYDROCHLORIDE 10 MG: 5 TABLET ORAL at 14:39

## 2024-01-01 RX ADMIN — CEPHALEXIN 500 MG: 500 CAPSULE ORAL at 05:26

## 2024-01-01 RX ADMIN — MOMETASONE FUROATE AND FORMOTEROL FUMARATE DIHYDRATE 2 PUFF: 200; 5 AEROSOL RESPIRATORY (INHALATION) at 05:30

## 2024-01-01 RX ADMIN — ALBUTEROL SULFATE 2 PUFF: 90 AEROSOL, METERED RESPIRATORY (INHALATION) at 05:29

## 2024-01-01 RX ADMIN — MOMETASONE FUROATE AND FORMOTEROL FUMARATE DIHYDRATE 2 PUFF: 200; 5 AEROSOL RESPIRATORY (INHALATION) at 17:51

## 2024-01-01 RX ADMIN — ENOXAPARIN SODIUM 40 MG: 100 INJECTION SUBCUTANEOUS at 17:51

## 2024-01-01 RX ADMIN — DIPHENHYDRAMINE HYDROCHLORIDE 25 MG: 25 TABLET ORAL at 21:57

## 2024-01-01 RX ADMIN — RIFAXIMIN 550 MG: 550 TABLET ORAL at 17:51

## 2024-01-01 RX ADMIN — ALBUTEROL SULFATE 2 PUFF: 90 AEROSOL, METERED RESPIRATORY (INHALATION) at 10:00

## 2024-01-01 RX ADMIN — CEPHALEXIN 500 MG: 500 CAPSULE ORAL at 17:51

## 2024-01-01 RX ADMIN — GABAPENTIN 600 MG: 300 CAPSULE ORAL at 21:57

## 2024-01-01 RX ADMIN — MIDODRINE HYDROCHLORIDE 10 MG: 5 TABLET ORAL at 21:57

## 2024-01-01 RX ADMIN — ALBUTEROL SULFATE 2 PUFF: 90 AEROSOL, METERED RESPIRATORY (INHALATION) at 21:58

## 2024-01-01 RX ADMIN — ALBUTEROL SULFATE 2 PUFF: 90 AEROSOL, METERED RESPIRATORY (INHALATION) at 14:00

## 2024-01-01 RX ADMIN — CEPHALEXIN 500 MG: 500 CAPSULE ORAL at 11:53

## 2024-01-01 RX ADMIN — MIDODRINE HYDROCHLORIDE 10 MG: 5 TABLET ORAL at 05:25

## 2024-01-01 ASSESSMENT — COGNITIVE AND FUNCTIONAL STATUS - GENERAL
MOVING TO AND FROM BED TO CHAIR: A LITTLE
STANDING UP FROM CHAIR USING ARMS: A LITTLE
SUGGESTED CMS G CODE MODIFIER MOBILITY: CK
MOBILITY SCORE: 18
CLIMB 3 TO 5 STEPS WITH RAILING: A LITTLE
MOVING FROM LYING ON BACK TO SITTING ON SIDE OF FLAT BED: A LITTLE
TURNING FROM BACK TO SIDE WHILE IN FLAT BAD: A LITTLE
WALKING IN HOSPITAL ROOM: A LITTLE

## 2024-01-01 ASSESSMENT — PAIN DESCRIPTION - PAIN TYPE
TYPE: ACUTE PAIN

## 2024-01-01 ASSESSMENT — ENCOUNTER SYMPTOMS
DIARRHEA: 0
FEVER: 0
SHORTNESS OF BREATH: 1
NAUSEA: 0
LOSS OF CONSCIOUSNESS: 0
CHILLS: 0
DOUBLE VISION: 0
SORE THROAT: 0
BACK PAIN: 0
DIZZINESS: 0
VOMITING: 0
HEADACHES: 0
PALPITATIONS: 0
ABDOMINAL PAIN: 0
BLURRED VISION: 0
COUGH: 1

## 2024-01-01 ASSESSMENT — GAIT ASSESSMENTS
DISTANCE (FEET): 200
ASSISTIVE DEVICE: FRONT WHEEL WALKER
DEVIATION: DECREASED HEEL STRIKE;DECREASED TOE OFF
GAIT LEVEL OF ASSIST: SUPERVISED

## 2024-01-01 NOTE — PROGRESS NOTES
Rec'd report from day shift RN. Assumed pt care. Assessment completed. AA&OX4. Denies pain at this time. No s/s of discomfort or distress. Pt is sitting at EOB, ambulates to BSC, maintains steady gait. Pt has a strong dry cough. BLE are rufino, taut (hard skin). Abdomen is semi-firm. Bed in lowest position, bed locked, treaded socks in place, RN and CNA numbers provided, call light within reach.

## 2024-01-01 NOTE — CARE PLAN
The patient is Stable - Low risk of patient condition declining or worsening    Shift Goals  Clinical Goals: Will titrate O2 by 1L down for the night for saturation to remain about 89-91% for COPD pt.  Patient Goals: stop pooping      Progress made toward(s) clinical / shift goals:      Patient is not progressing towards the following goals:

## 2024-01-01 NOTE — THERAPY
"Physical Therapy   Initial Evaluation     Patient Name: Yas Bernard  Age:  63 y.o., Sex:  female  Medical Record #: [id number]  Today's Date: 1/1/2024     Precautions  Precautions: Fall Risk    Assessment  Patient is 63 y.o. female with a diagnosis of acute respiratory failure, TARUN, acute encephalopathy, ascites, and LLE cellulitis. Other PMH includes cirrhosis, COPD, HTN, DM2, AAA. 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 Today, she required FWW for stability with ambulation, and demonstrated limitations with activity tolerance. She will continue to benefit from skilled PT while she remains in the acute setting. Recommend further PT in the home health setting upon D/C home.     Plan    Physical Therapy Initial Treatment Plan   Treatment Plan : (P) Bed Mobility, Gait Training, Neuro Re-Education / Balance, Stair Training, Therapeutic Activities, Therapeutic Exercise  Treatment Frequency: (P) 3 Times per Week  Duration: (P) Until Therapy Goals Met    DC Equipment Recommendations: (P) Front-Wheel Walker  Discharge Recommendations: (P) Recommend home health for continued physical therapy services       Subjective    \"I want to be safe and ready to go home.\"     Objective       01/01/24 1013   Precautions   Precautions Fall Risk   Vitals   O2 (LPM) 3   O2 Delivery Device Nasal Cannula   Pain 0 - 10 Group   Therapist Pain Assessment 0;Post Activity Pain Same as Prior to Activity  (feels \"itchy\")   Prior Living Situation   Prior Services Home-Independent   Housing / Facility 1 Story House   Steps Into Home 0   Steps In Home 0   Bathroom Set up Walk In Shower;Grab Bars   Equipment Owned 4-Wheel Walker   Lives with - Patient's Self Care Capacity Alone and Able to Care For Self   Comments reports independnece with self care and mobility at baseline sans AD   Prior Level of Functional Mobility   Bed Mobility Independent "   Transfer Status Independent   Ambulation Independent   Assistive Devices Used None   Stairs Independent   History of Falls   History of Falls No   Cognition    Cognition / Consciousness WDL   Level of Consciousness Alert   Comments pleasant, motivated, cooperative   Strength Lower Body   Lower Body Strength  WDL   Balance Assessment   Sitting Balance (Static) Fair +   Sitting Balance (Dynamic) Fair +   Standing Balance (Static) Fair   Standing Balance (Dynamic) Fair   Weight Shift Sitting Fair   Weight Shift Standing Fair   Comments with FWW   Bed Mobility    Supine to Sit Supervised   Sit to Supine Supervised   Scooting Supervised   Gait Analysis   Gait Level Of Assist Supervised   Assistive Device Front Wheel Walker   Distance (Feet) 200   # of Times Distance was Traveled 1   Deviation Decreased Heel Strike;Decreased Toe Off   # of Stairs Climbed 0   Weight Bearing Status no restriction   Comments no LOB   Functional Mobility   Sit to Stand Supervised   Bed, Chair, Wheelchair Transfer Supervised   Toilet Transfers Supervised   Tub / Shower Transfers Supervised   Transfer Method Stand Step   Mobility supine->sit EOB->stand->amb->chair   Comments no LOB   How much difficulty does the patient currently have...   Turning over in bed (including adjusting bedclothes, sheets and blankets)? 3   Sitting down on and standing up from a chair with arms (e.g., wheelchair, bedside commode, etc.) 3   Moving from lying on back to sitting on the side of the bed? 3   How much help from another person does the patient currently need...   Moving to and from a bed to a chair (including a wheelchair)? 3   Need to walk in a hospital room? 3   Climbing 3-5 steps with a railing? 3   6 clicks Mobility Score 18   Activity Tolerance   Sitting in Chair left up in chair   Sitting Edge of Bed 2 min approx   Standing 10 min approx   Comments tolerated well   Patient / Family Goals    Patient / Family Goal #1 be safe   Short Term Goals    Short  Term Goal # 1 Pt will tranfser bed<->chair with moD I and FWW within 6 visits in order to return home   Short Term Goal # 2 Pt will amb 300' with FWW and mod I within 6 visits in order to return home   Education Group   Education Provided Role of Physical Therapist   Role of Physical Therapist Patient Response Patient;Acceptance;Explanation;Verbal Demonstration   Physical Therapy Initial Treatment Plan    Treatment Plan  Bed Mobility;Gait Training;Neuro Re-Education / Balance;Stair Training;Therapeutic Activities;Therapeutic Exercise   Treatment Frequency 3 Times per Week   Duration Until Therapy Goals Met   Problem List    Problems Impaired Transfers;Impaired Ambulation;Impaired Balance;Decreased Activity Tolerance   Anticipated Discharge Equipment and Recommendations   DC Equipment Recommendations Front-Wheel Walker   Discharge Recommendations Recommend home health for continued physical therapy services

## 2024-01-02 ENCOUNTER — APPOINTMENT (OUTPATIENT)
Dept: RADIOLOGY | Facility: MEDICAL CENTER | Age: 64
DRG: 441 | End: 2024-01-02
Attending: HOSPITALIST
Payer: MEDICAID

## 2024-01-02 LAB
ANION GAP SERPL CALC-SCNC: 17 MMOL/L (ref 7–16)
BACTERIA BLD CULT: NORMAL
BACTERIA BLD CULT: NORMAL
BASOPHILS # BLD AUTO: 0.5 % (ref 0–1.8)
BASOPHILS # BLD: 0.04 K/UL (ref 0–0.12)
BUN SERPL-MCNC: 26 MG/DL (ref 8–22)
CALCIUM SERPL-MCNC: 8.8 MG/DL (ref 8.5–10.5)
CHLORIDE SERPL-SCNC: 94 MMOL/L (ref 96–112)
CO2 SERPL-SCNC: 25 MMOL/L (ref 20–33)
CREAT SERPL-MCNC: 0.88 MG/DL (ref 0.5–1.4)
EOSINOPHIL # BLD AUTO: 0.26 K/UL (ref 0–0.51)
EOSINOPHIL NFR BLD: 3.2 % (ref 0–6.9)
ERYTHROCYTE [DISTWIDTH] IN BLOOD BY AUTOMATED COUNT: 56.2 FL (ref 35.9–50)
GFR SERPLBLD CREATININE-BSD FMLA CKD-EPI: 74 ML/MIN/1.73 M 2
GLUCOSE SERPL-MCNC: 110 MG/DL (ref 65–99)
HCT VFR BLD AUTO: 48.5 % (ref 37–47)
HGB BLD-MCNC: 16.2 G/DL (ref 12–16)
IMM GRANULOCYTES # BLD AUTO: 0.02 K/UL (ref 0–0.11)
IMM GRANULOCYTES NFR BLD AUTO: 0.2 % (ref 0–0.9)
LYMPHOCYTES # BLD AUTO: 1.02 K/UL (ref 1–4.8)
LYMPHOCYTES NFR BLD: 12.6 % (ref 22–41)
MCH RBC QN AUTO: 34 PG (ref 27–33)
MCHC RBC AUTO-ENTMCNC: 33.4 G/DL (ref 32.2–35.5)
MCV RBC AUTO: 101.7 FL (ref 81.4–97.8)
MONOCYTES # BLD AUTO: 1.44 K/UL (ref 0–0.85)
MONOCYTES NFR BLD AUTO: 17.7 % (ref 0–13.4)
NEUTROPHILS # BLD AUTO: 5.34 K/UL (ref 1.82–7.42)
NEUTROPHILS NFR BLD: 65.8 % (ref 44–72)
NRBC # BLD AUTO: 0 K/UL
NRBC BLD-RTO: 0 /100 WBC (ref 0–0.2)
PLATELET # BLD AUTO: 140 K/UL (ref 164–446)
PMV BLD AUTO: 9.9 FL (ref 9–12.9)
POTASSIUM SERPL-SCNC: 4.1 MMOL/L (ref 3.6–5.5)
RBC # BLD AUTO: 4.77 M/UL (ref 4.2–5.4)
SIGNIFICANT IND 70042: NORMAL
SIGNIFICANT IND 70042: NORMAL
SITE SITE: NORMAL
SITE SITE: NORMAL
SODIUM SERPL-SCNC: 136 MMOL/L (ref 135–145)
SOURCE SOURCE: NORMAL
SOURCE SOURCE: NORMAL
WBC # BLD AUTO: 8.1 K/UL (ref 4.8–10.8)

## 2024-01-02 PROCEDURE — 80048 BASIC METABOLIC PNL TOTAL CA: CPT

## 2024-01-02 PROCEDURE — 700102 HCHG RX REV CODE 250 W/ 637 OVERRIDE(OP): Performed by: INTERNAL MEDICINE

## 2024-01-02 PROCEDURE — 700102 HCHG RX REV CODE 250 W/ 637 OVERRIDE(OP)

## 2024-01-02 PROCEDURE — 770006 HCHG ROOM/CARE - MED/SURG/GYN SEMI*

## 2024-01-02 PROCEDURE — 76705 ECHO EXAM OF ABDOMEN: CPT

## 2024-01-02 PROCEDURE — 97165 OT EVAL LOW COMPLEX 30 MIN: CPT

## 2024-01-02 PROCEDURE — 85025 COMPLETE CBC W/AUTO DIFF WBC: CPT

## 2024-01-02 PROCEDURE — 700102 HCHG RX REV CODE 250 W/ 637 OVERRIDE(OP): Performed by: HOSPITALIST

## 2024-01-02 PROCEDURE — A9270 NON-COVERED ITEM OR SERVICE: HCPCS | Performed by: HOSPITALIST

## 2024-01-02 PROCEDURE — A9270 NON-COVERED ITEM OR SERVICE: HCPCS | Performed by: INTERNAL MEDICINE

## 2024-01-02 PROCEDURE — A9270 NON-COVERED ITEM OR SERVICE: HCPCS

## 2024-01-02 PROCEDURE — 99233 SBSQ HOSP IP/OBS HIGH 50: CPT | Performed by: INTERNAL MEDICINE

## 2024-01-02 PROCEDURE — 700111 HCHG RX REV CODE 636 W/ 250 OVERRIDE (IP): Mod: JZ | Performed by: HOSPITALIST

## 2024-01-02 PROCEDURE — 36415 COLL VENOUS BLD VENIPUNCTURE: CPT

## 2024-01-02 RX ORDER — MIDODRINE HYDROCHLORIDE 5 MG/1
5 TABLET ORAL EVERY 8 HOURS
Status: DISCONTINUED | OUTPATIENT
Start: 2024-01-02 | End: 2024-01-03 | Stop reason: HOSPADM

## 2024-01-02 RX ORDER — LACTULOSE 20 G/30ML
45 SOLUTION ORAL 3 TIMES DAILY
Qty: 4050 ML | Refills: 0 | Status: SHIPPED | OUTPATIENT
Start: 2024-01-03 | End: 2024-02-02

## 2024-01-02 RX ORDER — FUROSEMIDE 40 MG/1
40 TABLET ORAL DAILY
Qty: 30 TABLET | Refills: 0 | Status: SHIPPED | OUTPATIENT
Start: 2024-01-03

## 2024-01-02 RX ORDER — SPIRONOLACTONE 100 MG/1
100 TABLET, FILM COATED ORAL
Status: DISCONTINUED | OUTPATIENT
Start: 2024-01-03 | End: 2024-01-03 | Stop reason: HOSPADM

## 2024-01-02 RX ORDER — SPIRONOLACTONE 100 MG/1
100 TABLET, FILM COATED ORAL DAILY
Qty: 30 TABLET | Refills: 0 | Status: SHIPPED | OUTPATIENT
Start: 2024-01-03

## 2024-01-02 RX ORDER — ACETAMINOPHEN 325 MG/1
650 TABLET ORAL EVERY 6 HOURS PRN
Qty: 30 TABLET | Refills: 0 | COMMUNITY
Start: 2024-01-02

## 2024-01-02 RX ORDER — ALBUTEROL SULFATE 90 UG/1
2 AEROSOL, METERED RESPIRATORY (INHALATION) EVERY 4 HOURS
Qty: 8.5 G | Refills: 0 | Status: SHIPPED | OUTPATIENT
Start: 2024-01-02

## 2024-01-02 RX ORDER — GABAPENTIN 300 MG/1
600 CAPSULE ORAL EVERY EVENING
Qty: 60 CAPSULE | Refills: 0 | Status: SHIPPED | OUTPATIENT
Start: 2024-01-02 | End: 2024-02-02

## 2024-01-02 RX ORDER — FUROSEMIDE 40 MG/1
40 TABLET ORAL
Status: DISCONTINUED | OUTPATIENT
Start: 2024-01-03 | End: 2024-01-03 | Stop reason: HOSPADM

## 2024-01-02 RX ADMIN — MIDODRINE HYDROCHLORIDE 10 MG: 5 TABLET ORAL at 05:39

## 2024-01-02 RX ADMIN — CEPHALEXIN 500 MG: 500 CAPSULE ORAL at 05:39

## 2024-01-02 RX ADMIN — MIDODRINE HYDROCHLORIDE 10 MG: 5 TABLET ORAL at 13:20

## 2024-01-02 RX ADMIN — CEPHALEXIN 500 MG: 500 CAPSULE ORAL at 18:20

## 2024-01-02 RX ADMIN — ENOXAPARIN SODIUM 40 MG: 100 INJECTION SUBCUTANEOUS at 18:07

## 2024-01-02 RX ADMIN — ALBUTEROL SULFATE 2 PUFF: 90 AEROSOL, METERED RESPIRATORY (INHALATION) at 05:38

## 2024-01-02 RX ADMIN — MOMETASONE FUROATE AND FORMOTEROL FUMARATE DIHYDRATE 2 PUFF: 200; 5 AEROSOL RESPIRATORY (INHALATION) at 05:38

## 2024-01-02 RX ADMIN — CEPHALEXIN 500 MG: 500 CAPSULE ORAL at 12:15

## 2024-01-02 RX ADMIN — FUROSEMIDE 40 MG: 10 INJECTION, SOLUTION INTRAVENOUS at 05:39

## 2024-01-02 RX ADMIN — ALBUTEROL SULFATE 2 PUFF: 90 AEROSOL, METERED RESPIRATORY (INHALATION) at 14:00

## 2024-01-02 RX ADMIN — ALBUTEROL SULFATE 2 PUFF: 90 AEROSOL, METERED RESPIRATORY (INHALATION) at 18:00

## 2024-01-02 RX ADMIN — GABAPENTIN 600 MG: 300 CAPSULE ORAL at 18:07

## 2024-01-02 RX ADMIN — RIFAXIMIN 550 MG: 550 TABLET ORAL at 18:07

## 2024-01-02 RX ADMIN — RIFAXIMIN 550 MG: 550 TABLET ORAL at 05:39

## 2024-01-02 RX ADMIN — LACTULOSE 45 ML: 20 SOLUTION ORAL at 05:38

## 2024-01-02 RX ADMIN — SKIN PROTECTANT: 33 OINTMENT TOPICAL at 05:38

## 2024-01-02 RX ADMIN — ALBUTEROL SULFATE 2 PUFF: 90 AEROSOL, METERED RESPIRATORY (INHALATION) at 21:06

## 2024-01-02 RX ADMIN — ALBUTEROL SULFATE 2 PUFF: 90 AEROSOL, METERED RESPIRATORY (INHALATION) at 10:00

## 2024-01-02 RX ADMIN — MOMETASONE FUROATE AND FORMOTEROL FUMARATE DIHYDRATE 2 PUFF: 200; 5 AEROSOL RESPIRATORY (INHALATION) at 18:07

## 2024-01-02 ASSESSMENT — COGNITIVE AND FUNCTIONAL STATUS - GENERAL
HELP NEEDED FOR BATHING: A LITTLE
TOILETING: A LITTLE
SUGGESTED CMS G CODE MODIFIER DAILY ACTIVITY: CJ
DAILY ACTIVITIY SCORE: 21
DRESSING REGULAR LOWER BODY CLOTHING: A LITTLE

## 2024-01-02 ASSESSMENT — ENCOUNTER SYMPTOMS
SORE THROAT: 0
SHORTNESS OF BREATH: 1
DIZZINESS: 0
BLURRED VISION: 0
FEVER: 0
DOUBLE VISION: 0
LOSS OF CONSCIOUSNESS: 0
NAUSEA: 0
COUGH: 1
ABDOMINAL PAIN: 0
HEADACHES: 0
PALPITATIONS: 0
VOMITING: 0
CHILLS: 0
DIARRHEA: 0
BACK PAIN: 0

## 2024-01-02 ASSESSMENT — ACTIVITIES OF DAILY LIVING (ADL): TOILETING: INDEPENDENT

## 2024-01-02 ASSESSMENT — PAIN DESCRIPTION - PAIN TYPE
TYPE: ACUTE PAIN
TYPE: ACUTE PAIN

## 2024-01-02 NOTE — DISCHARGE PLANNING
Referral sent per choice to [facility]    1134- Spoke To: Rosemary  [facility]  Plan or Request: Referral not received yet, DPA resent 02 order.    1309- Spoke To: Beena  [facility]  Plan or Request: Approved, 02 will be brought to bedside.

## 2024-01-02 NOTE — CARE PLAN
The patient is Stable - Low risk of patient condition declining or worsening    Shift Goals  Clinical Goals: wean oxygen as tolerated  Patient Goals: rest/comfort  Family Goals: gisell    Progress made toward(s) clinical / shift goals:  Pt is Aox4. On 3L nasal cannula. Home oxygen and walker delivered to room. RUQ US completed today. No acute distress noted. Pt remains free of falls.     Patient is not progressing towards the following goals:

## 2024-01-02 NOTE — THERAPY
Allergy Medicines: Over-the-Counter    You can buy many allergy medicines without a prescription. You may:  · Use the over-the-counter (OTC) alone or with prescription medicine  · Use all medicines exactly as instructed  · If you have any questions about your allergy medicine, ask your healthcare provider or your pharmacist  There are many OTC allergy medicines including antihistamines, decongestants, and steroid nasal spray. And, there are combination products. For example, a pill with both an antihistamine and a decongestant. You may also be instructed to take more than one medicine. For example, a steroid nasal spray and an antihistamine nasal spray.  Antihistamines  Antihistamines block the release of histamine, the substance released by your body that causes many allergy symptoms. They help lessen sneezing, itching, and runny noses.   Antihistamines:  · Are available as pills, liquids, and nasal sprays.  · May cause you to be sleepy  · Should be taken as instructed  Decongestants  Decongestants reduce swelling of the nasal passages. They relieve pressure and pain in your sinuses.  Decongestants:  · Are available as pills, liquids, and nasal sprays  · Should not be used for more than a few days. Overuse can actually make your symptoms worse.  Steroid nasal sprays  Steroid nasal sprays are used for nasal allergy symptoms. They help to lessen nasal congestion and swelling, runny noses, and sneezing.  Steroid nasal sprays:  · Shouldn't be used without your provider's advice  · Can cause side effects, like nasal bleeding  · Should be sprayed into the nose correctly  Make sure you read and follow the instructions on the label. If you have any questions about these medicines, ask your healthcare provider or pharmacist.  Date Last Reviewed: 9/1/2016  © 2340-4648 [email or web address]. [address] [place] [address], [place] [address]. All rights reserved. This information is not intended as a substitute for  Occupational Therapy   Initial Evaluation     Patient Name: Yas Bernard  Age:  63 y.o., Sex:  female  Medical Record #: [id number]  Today's Date: 1/2/2024     Precautions  Precautions: (P) Fall Risk    Assessment  Patient is a 63 y.o. female with a diagnosis of acute respiratory failure, TARUN, acute encephalopathy, ascites, and LLE cellulitis. Additional factors influencing patient status / progress: PMHx includes cirrhosis, COPD, HTN, DM2, AAA. Pt demo's functional mob and ADLs detailed below with overall SPV and no AD. Pt was receptive to education and training for managing O2 line and portable tank to simulate accessing her bathroom at home as she states the line from her home-unit concentrator (newly issued this admission) would likely not reach into her bathroom. Reports her neighbor could assist with setting her DME up once d/c'd home. Pt has no further acute OT needs at this time. Patient will not be actively followed for occupational therapy services at this time, however may be seen if requested by physician for 1 more visit within 30 days to address any discharge or equipment needs.     Plan    Occupational Therapy Initial Treatment Plan   Duration: (P) Discharge Needs Only    DC Equipment Recommendations: (P) Tub / Shower Seat (states she will order a shower chair)  Discharge Recommendations: (P) Recommend home health for continued occupational therapy services     Subjective    Pt agreeable to OT eval      Objective     01/02/24 1509   Prior Living Situation   Prior Services Home-Independent   Housing / Facility 1 Story House   Steps Into Home 0   Steps In Home 0   Bathroom Set up Walk In Shower;Grab Bars   Equipment Owned Front-Wheel Walker;4-Wheel Walker;Oxygen  (newly supplied home O2 and FWW this admission)   Lives with - Patient's Self Care Capacity Alone and Able to Care For Self   Comments Pt reports indpendent PLOF with ADLs, IADLs, driving and wokring   Prior Level of ADL Function   Self Feeding  Independent   Grooming / Hygiene Independent   Bathing Independent   Dressing Independent   Toileting Independent   Prior Level of IADL Function   Medication Management Independent   Laundry Independent   Kitchen Mobility Independent   Finances Independent   Home Management Independent   Shopping Independent   Prior Level Of Mobility Independent Without Device in Community   Driving / Transportation Driving Independent   Occupation (Pre-Hospital Vocational) Employed Full Time  (works at a [employer])   History of Falls   History of Falls No   Precautions   Precautions Fall Risk   Pain   Intervention Declines   Non Verbal Descriptors   Non Verbal Scale  Calm   Cognition    Level of Consciousness Alert   Active ROM Upper Body   Active ROM Upper Body  WDL   Strength Upper Body   Upper Body Strength  WDL   Sensation Upper Body   Upper Extremity Sensation  Not Tested   Upper Body Muscle Tone   Upper Body Muscle Tone  WDL   Neurological Concerns   Neurological Concerns No   Coordination Upper Body   Coordination WDL   Balance Assessment   Sitting Balance (Static) Good   Sitting Balance (Dynamic) Good   Standing Balance (Static) Fair +   Standing Balance (Dynamic) Fair   Weight Shift Sitting Good   Weight Shift Standing Good   Comments no AD, manages portable O2 tank with SPV and initial verbal cues   Bed Mobility    Supine to Sit Supervised   Sit to Supine Supervised   Scooting Supervised   ADL Assessment   Upper Body Dressing Independent  (gown)   Lower Body Dressing Supervision  (underwear)   Toileting   (declined need)   How much help from another person does the patient currently need...   Putting on and taking off regular lower body clothing? 3   Bathing (including washing, rinsing, and drying)? 3   Toileting, which includes using a toilet, bedpan, or urinal? 3   Putting on and taking off regular upper body clothing? 4   Taking care of personal grooming such as brushing teeth? 4   Eating meals? 4   6 Clicks Daily  professional medical care. Always follow your healthcare professional's instructions.          When Your Child Has Nasal Allergies (Allergic Rhinitis)    Nasal allergies are also called allergic rhinitis. Rhinitis is a reaction that occurs in the nose. It happens when irritants or allergens in the air trigger the body to make histamine. Histamine causes itching and inflammation. It also causes mucus to be made in the nose and sinus linings and eyelids.   Children with nasal allergies are sensitive to one or more substances in the air. Some children have allergies that come and go with the seasons (hay fever). Others may have allergies all year long. These allergies can cause your child to lose sleep and feel tired. Your child may have trouble paying attention in school. It's important that you and your child’s healthcare provider make a plan to help keep your child’s allergies under control.  What are the types of nasal allergies?  The 2 types of nasal allergies are:  · Seasonal. This type occurs mainly during pollen seasons.  · Perennial. This type occurs throughout the year.  What causes nasal allergies?  Nasal allergies are often caused by one or more of these:  · Dust mites (tiny bugs that live in carpets, bedding, stuffed toys, and other fabric items)  · Pollen from grasses, trees, and weeds  · Cockroaches  · Animal dander from furry or feathered pets or other animals  · Mold  · Insect venom  · Tobacco smoke  · Certain foods, chemicals, and medicines  There is often a family history of nasal allergies.  What are the symptoms of nasal allergies?  Symptoms of nasal allergies can be mild or severe. Your child may have:  · Sneezing  · Runny (clear drainage) or stuffy nose  · Itchy, watery, red, or swollen eyes  · Itchy nose, throat, and ears  · Nosebleeds  · Cough from mucus dripping down the back of the throat (postnasal drip)  · Sore throat  · Wheezing  · Dark circles under the eyes  · Face pressure or  Activity Score 21   Functional Mobility   Sit to Stand Supervised   Toilet Transfers Supervised   Tub / Shower Transfers Supervised  (dry run step-over WIS threshold)   Transfer Method Stand Step   Mobility in room and on unit household distances with no AD, manages portable O2 tank with SPV and initial VCs   Activity Tolerance   Sitting Edge of Bed ~5 min total   Standing ~15 min total   Education Group   Education Provided Transfers;Home Safety;Role of Occupational Therapist;Activities of Daily Living;Adaptive Equipment;Other (comments)   Role of Occupational Therapist Patient Response Patient;Acceptance;Explanation;Verbal Demonstration   Home Safety Patient Response Patient;Acceptance;Explanation;Verbal Demonstration   Transfers Patient Response Patient;Acceptance;Explanation;Verbal Demonstration;Action Demonstration   ADL Patient Response Patient;Acceptance;Explanation;Verbal Demonstration;Action Demonstration   Adaptive Equipment Patient Response Patient;Acceptance;Explanation;Verbal Demonstration;Action Demonstration   Additional Comments Education and training on managing portable O2 tank without AD in prep for use at home/community; was also issued a home-unit concentrator but pt states she would likely need to use the portable tank to access her bathroom. Demo'd mgmt of tank with functional mob and transfers with SPV   Occupational Therapy Initial Treatment Plan    Duration Discharge Needs Only   Problem List   Problem List Decreased Functional Mobility;Decreased Activity Tolerance   Anticipated Discharge Equipment and Recommendations   DC Equipment Recommendations Tub / Shower Seat  (states she will order a shower chair)   Discharge Recommendations Recommend home health for continued occupational therapy services   Interdisciplinary Plan of Care Collaboration   IDT Collaboration with  Nursing   Patient Position at End of Therapy In Bed;Bed Alarm On;Call Light within Reach;Tray Table within Reach;Phone  pain  · Frequent ear or sinus infections  · Snoring  · Poor performance in school  These symptoms may look like other health problems. Always see your child's healthcare provider for a diagnosis.  How are nasal allergies diagnosed?  Your child's healthcare provider will ask about your child's past health. He or she will also do a physical exam. Your child may be referred to an allergist. This is a healthcare provider who specializes in allergy skin testing. Skin or blood tests help identify which allergens your child is most sensitive to. This helps you and your child’s healthcare provider make a good treatment plan.  How are nasal allergies treated?  Limiting your child’s exposure to allergens is a vital part of treatment. Talk with your child's healthcare provider about the best way to limit your child’s contact with things that cause his or her allergies. Your healthcare provider may also suggest one or more medicines, such as:  · Antihistamines. These ease itching, sneezing, and a runny nose. They can be used on their own or along with steroid nasal sprays. You can buy many antihistamines in stores. Others are prescribed. Certain antihistamines can make your child sleepy. Always talk with your child's healthcare provider or allergist before giving over-the-counter medicine.  · Steroid nasal sprays. These help reduce swelling. They also ease itching and sneezing. They aren’t the same as the decongestant nasal sprays you buy in the store. Steroid nasal sprays are often used each day to prevent symptoms.  · Other medicines. Healthcare providers sometimes give other medicines, such as leukotriene inhibitors, cromolyn sodium, or allergy eye drops.  · Allergy shots (immunotherapy). Allergy shots have tiny amounts of the substances your child is allergic to, such as pollen or dust mites. The shots may make your child less sensitive to these allergens. The shots are given in your child's healthcare provider’s office.  They won’t work unless your child gets them regularly, often for years. Another type of immunotherapy is called sublingual immunotherapy (SLIT). It is given under the tongue in the form of tablets or drops. It can be done at home. Ask your child's healthcare provider or allergist if SLIT or allergy shots is the best treatment for your child's allergy.  Irritants make nasal allergies worse  Irritants don’t cause nasal allergies. But they can make symptoms worse. Common irritants are:  · Cigarette smoke  · Perfume  · Aerosol sprays  · Smoke from wood stoves or fireplaces  · Car exhaust   · Pets  When to call your child's healthcare provider  Call your child's healthcare provider if he or she has any of these:  · Trouble breathing  · Wheezing  · Frequent headaches  · Fever and greenish or yellowish drainage from the nose  · Worsening of allergy symptoms  Shahzad last reviewed this educational content on 5/1/2019  © 4278-6780 [employer]. All rights reserved. This information is not intended as a substitute for professional medical care. Always follow your healthcare professional's instructions.      If you were prescribed a medication today please be aware that the [facility] providers are not able to provide refills on these medications. If you require a refill, please contact your primary care provider. If you do not have a primary care provider please call [phone] and we can schedule a visit with an [facility] primary care provider.      within Reach   Collaboration Comments RN cleared pt for session   Session Information   Date / Session Number  1/2 #1, d/c needs

## 2024-01-02 NOTE — CARE PLAN
The patient is Watcher - Medium risk of patient condition declining or worsening    Shift Goals  Clinical Goals: complete abd US  Patient Goals: rest/comfort  Family Goals: gisell    Progress made toward(s) clinical / shift goals:  Pt is Aox4. On 3L NC. Home O2 trial completed. NPO at 0100 on 1/2/2023 for an abdominal US RUQ. Eucerin cream applied to skin for itching.  Pt evaluated pt today. No acute distress noted. Pt remains free of falls.     Patient is not progressing towards the following goals:

## 2024-01-02 NOTE — PROGRESS NOTES
Hospital Medicine Daily Progress Note    Date of Service  1/2/2024    Chief Complaint  Yas Bernard is a 63 y.o. 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  In ED found to be hypoxic, hyperkalemic and in TARUN.  Paracentesis of 4 L done.  Transferred to ICU on 12/28 with hypercarbic resp failure on BiPAP    Interval Problem Update  1/2 On 3 L NC, otherwise vitals stable.   Hb elevated 16.2.   RUQ US showed chronic liver disease, small amount perihepatic fluid, cholelithiasis and contracted gallbladder.  FWW ordered as well as home oxygen  Patient reports that her breathing overall has improved.  She again worked with therapy today and feels more confident about going home.  Change IV Lasix to Lasix 40 mg daily and Aldactone 100 mg daily.  Discussed with case management, patient does not have coverage for home health, I have placed outpatient referrals for PT/OT  If labs stable in a.m. plan for DC home    I have discussed this patient's plan of care and discharge plan at IDT rounds today with Case Management, Nursing, Nursing leadership, and other members of the IDT team.    Consultants/Specialty  Critical Care   Gastroenterology     Code Status  Full Code    Disposition  The patient is not medically cleared for discharge to home or a post-acute facility.  Anticipate discharge to: home with close outpatient follow-up    I have placed the appropriate orders for post-discharge needs.    Review of Systems  Review of Systems   Constitutional:  Positive for malaise/fatigue. Negative for chills and fever.   HENT:  Negative for nosebleeds and sore throat.    Eyes:  Negative for blurred vision and double vision.   Respiratory:  Positive for cough and shortness of breath.    Cardiovascular:  Negative for chest pain, palpitations and leg swelling.    Gastrointestinal:  Negative for abdominal pain, diarrhea, nausea and vomiting.   Genitourinary:  Negative for dysuria and urgency.   Musculoskeletal:  Negative for back pain.   Skin:  Negative for rash.   Neurological:  Negative for dizziness, loss of consciousness and headaches.        Physical Exam  Temp:  [35.9 °C (96.7 °F)-36.7 °C (98 °F)] 36.6 °C (97.9 °F)  Pulse:  [58-71] 58  Resp:  [16-18] 18  BP: (108-132)/(63-70) 120/64  SpO2:  [92 %-95 %] 95 %    Physical Exam  Constitutional:       General: She is not in acute distress.     Appearance: Normal appearance. She is well-developed.   HENT:      Head: Normocephalic and atraumatic.   Eyes:      Conjunctiva/sclera: Conjunctivae normal.   Neck:      Vascular: No JVD.   Cardiovascular:      Rate and Rhythm: Normal rate and regular rhythm.      Heart sounds: No murmur heard.  Pulmonary:      Effort: Pulmonary effort is normal. No respiratory distress.      Breath sounds: No wheezing or rales.      Comments: Diminished breath sounds in bases   On NC  Abdominal:      General: There is distension (mild).      Palpations: Abdomen is soft.      Tenderness: There is no abdominal tenderness. There is no guarding or rebound.   Musculoskeletal:         General: No tenderness.      Right lower leg: No edema.      Left lower leg: No edema.   Skin:     General: Skin is warm and dry.      Capillary Refill: Capillary refill takes less than 2 seconds.      Findings: Erythema (mild b/l LE) present. No rash.   Neurological:      General: No focal deficit present.      Mental Status: She is oriented to person, place, and time.      Motor: No weakness.      Gait: Gait normal.   Psychiatric:         Mood and Affect: Mood normal.         Behavior: Behavior normal.         Thought Content: Thought content normal.         Fluids    Intake/Output Summary (Last 24 hours) at 1/2/2024 1800  Last data filed at 1/2/2024 1400  Gross per 24 hour   Intake 360 ml   Output --   Net 360 ml        Laboratory  Recent Labs     01/01/24  0309 01/02/24  0048   WBC 7.6 8.1   RBC 4.91 4.77   HEMOGLOBIN 16.4* 16.2*   HEMATOCRIT 50.1* 48.5*   .0* 101.7*   MCH 33.4* 34.0*   MCHC 32.7 33.4   RDW 56.5* 56.2*   PLATELETCT 142* 140*   MPV 9.8 9.9     Recent Labs     12/31/23  0314 01/01/24  0309 01/02/24  0048   SODIUM 133* 134* 136   POTASSIUM 4.6 4.2 4.1   CHLORIDE 91* 91* 94*   CO2 29 29 25   GLUCOSE 89 88 110*   BUN 37* 30* 26*   CREATININE 1.10 0.76 0.88   CALCIUM 8.7 8.9 8.8                     Imaging  US-RUQ   Final Result      1.  Changes of chronic liver disease.   2.  Small amount of perihepatic fluid.   3.  Cholelithiasis and contracted gallbladder.      EC-ECHOCARDIOGRAM COMPLETE W/O CONT   Final Result      US-EXTREMITY VENOUS LOWER BILAT   Final Result      CT-HEAD W/O   Final Result      No acute process.         US-RENAL   Final Result      Normal exam.      DX-CHEST-LIMITED (1 VIEW)   Final Result         No pulmonary infiltrates or consolidations are noted.      Cardiomegaly.           Assessment/Plan  Type 2 diabetes mellitus (HCC)- (present on admission)  Assessment & Plan  A1c 6.4  Given ESLD and concern for nutritional status would prefer not to restrict her diet    Cellulitis of left lower extremity- (present on admission)  Assessment & Plan  Blood cultures neg  DC cefazolin and start keflex    Cirrhosis (HCC)- (present on admission)  Assessment & Plan  Check RUQ u/s.  Acute onset of ascites, worsening edema, though sober x 8 years.    Acute on chronic respiratory failure with hypoxia and hypercapnia (HCC)- (present on admission)  Assessment & Plan  History of COPD though not on home oxygen.  She was told she needed to be on it, and in fact had it at home, but insurance stopped covering it and she did not have the money to pay for it herself.  Continue diuresis: Currently on Lasix 80 mg 3 times daily.    12/31:  Decreased lasix 80 IV tid to 40 IV bid.  O2 and RT  protocols  Mobilize  Encourage smoking cessation  No prior home oxygen.    Home o2 ordered    Encephalopathy acute- (present on admission)  Assessment & Plan  Acute HE now resolved  Lactulose  rifaximin    Hyperkalemia- (present on admission)  Assessment & Plan  resolved    Acute kidney injury (HCC)- (present on admission)  Assessment & Plan  Per chart review has normal baseline kidney function  Patient has been taking NSAIDs prior to her presentation  Has responded to paracentesis, and initiation of loop diuretic.  Continue to follow daily BMP and urine output      Elevated troponin- (present on admission)  Assessment & Plan  Clinically looks like demand ischemia  Trop marginally elevated   TTE pending  EKG reassuring  Cont Tele  No CP or apparent CP equivalent    Acute respiratory failure with hypoxia  Assessment & Plan  At this time she requires 5 to 8 L oxygen mask.  History of COPD, currently not in exacerbation  Etiology may include hepatopulmonary syndrome, congestive heart failure, pneumonia  Chest x-ray showed hazy bilateral opacities  BNP is elevated at 13,000  Plan to continue forced diuresis IV Lasix 60 mg twice daily  Obtain transthoracic echo, D-dimer  Monitor daily weight, input and output      COPD (chronic obstructive pulmonary disease) (HCC)- (present on admission)  Assessment & Plan  Not in exacerbation  Albuterol as needed  Supplemental oxygen    Ascites- (present on admission)  Assessment & Plan  Secondary to known liver cirrhosis  Status post evacuation of at least 4 L in ER of transparent  brown liquid  Now back on IV Lasix  Add Aldactone once done with acute diuresis           VTE prophylaxis: lovenox ppx    I have performed a physical exam and reviewed and updated ROS and Plan today (1/2/2024). In review of yesterday's note (1/1/2024), there are no changes except as documented above.

## 2024-01-02 NOTE — PROGRESS NOTES
Rec'd report from day shift RN. Assumed pt care. Assessment completed. AA&OX4. Denies pain at this time. No s/s of discomfort or distress. BLE are rufino, taut (hard skin), dependent edema noted. Abdomen is semi-firm. Generalized scabs everywhere. Bed in lowest position, bed locked, treaded socks in place, RN and CNA numbers provided, call light within reach.

## 2024-01-02 NOTE — DISCHARGE PLANNING
Case Management Discharge Planning    Admission Date: 12/28/2023  GMLOS: 4.9  ALOS: 5    6-Clicks ADL Score: 18  6-Clicks Mobility Score: 18      Anticipated Discharge Dispo: Discharge Disposition: Discharged to home/self care (01)  Discharge Address: [address] [place]  [address]  Discharge Contact Phone Number: [phone]    DME Needed: home oxygen, FWW    Action(s) Taken:   RN CM met with patient at bedside.  Pt sitting side of bed receiving oxygen 3LNC.    Pt stated she just moved to [place] recently.  She lives alone in a one story house in [place].  Pt drives and has a job.  Pt stated she requires a work excuse note.  No PCP.  Hospital  programmed and in AVS.  PT recommended FWW.  Home oxygen study resulted.  Pt requiring home oxygen.  I explained FWW and home oxygen.  Pt agreeable.  Pt signed choice form for [facility] and [facility].  Form given to DPA.  Pt will need transportation to [facility] in [place] where her car is.    Next Steps:   Follow up with [facility].  Arrange transportation.    Barriers to Discharge: DME    Care Transition Team Assessment    Information Source  Orientation Level: Oriented X4  Information Given By: Patient  Who is responsible for making decisions for patient? : Patient    Readmission Evaluation  Is this a readmission?: No    Elopement Risk  Legal Hold: No  Ambulatory or Self Mobile in Wheelchair: Yes  Disoriented: No  Psychiatric Symptoms: None  History of Wandering: No  Elopement this Admit: No  Vocalizing Wanting to Leave: No  Displays Behaviors, Body Language Wanting to Leave: No-Not at Risk for Elopement  Elopement Risk: Not at Risk for Elopement    Interdisciplinary Discharge Planning  Lives with - Patient's Self Care Capacity: Alone and Able to Care For Self  Patient or legal guardian wants to designate a caregiver: No  Support Systems: Family Member(s)  Housing / Facility: [address] [facility]  Prior Services: Home-Independent  Durable Medical  Equipment: Not Applicable    Discharge Preparedness  What is your plan after discharge?: Home with help  What are your discharge supports?: Sibling, Other (comment)  Prior Functional Level: Ambulatory, Drives Self, Independent with Activities of Daily Living, Independent with Medication Management  Difficulity with ADLs: Walking  Difficulity with IADLs: None    Functional Assesment  Prior Functional Level: Ambulatory, Drives Self, Independent with Activities of Daily Living, Independent with Medication Management    Finances  Financial Barriers to Discharge: No  Prescription Coverage: Yes    Vision / Hearing Impairment  Vision Impairment : Yes  Right Eye Vision: Impaired, Wears Glasses  Left Eye Vision: Impaired, Wears Glasses  Hearing Impairment : No         Advance Directive  Advance Directive?: None    Domestic Abuse  Have you ever been the victim of abuse or violence?: No  Physical Abuse or Sexual Abuse: No  Verbal Abuse or Emotional Abuse: No  Possible Abuse/Neglect Reported to:: Not Applicable    Psychological Assessment  History of Substance Abuse: Alcohol  History of Psychiatric Problems: No  Non-compliant with Treatment: No    Discharge Risks or Barriers  Discharge risks or barriers?: No PCP, Uninsured / underinsured  Patient risk factors: Bariatric, Cognitive / sensory / physical deficit, No PCP, Uninsured or underinsured    Anticipated Discharge Information  Discharge Disposition: Discharged to home/self care (01)  Discharge Address: [facility] [place]  [address]  Discharge Contact Phone Number: [phone]

## 2024-01-02 NOTE — FACE TO FACE
"Face to Face Note  -  Durable Medical Equipment    Isabel Isabel D.O. - NPI: [id number]  I certify that this patient is under my care and that they had a durable medical equipment(DME)face to face encounter by myself that meets the physician DME face-to-face encounter requirements with this patient on:    Date of encounter:   Patient:                    MRN:                       YOB: 2024  Yas Bernard  [id number]  1960     The encounter with the patient was in whole, or in part, for the following medical condition, which is the primary reason for durable medical equipment:  COPD    I certify that, based on my findings, the following durable medical equipment is medically necessary:    Oxygen   HOME O2 Saturation Measurements:(Values must be present for Home Oxygen orders)  Room air sat at rest: 82  Room air sat with amb: 84  With liters of O2: 3, O2 sat at rest with O2: 92  With Liters of O2: 6, O2 sat with amb with O2 : 91  Is the patient mobile?: Yes  If patient feels more short of breath, they can go up to 6 liters per minute and contact healthcare provider.    Supporting Symptoms: The patient requires supplemental oxygen, as the following interventions have been tried with limited or no improvement: \"Positive expiratory pressure therapies, \"Ambulation with oximetry, and \"Incentive spirometry.    My Clinical findings support the need for the above equipment due to:  Hypoxia  "

## 2024-01-02 NOTE — PROGRESS NOTES
Hospital Medicine Daily Progress Note    Date of Service  1/1/2024    Chief Complaint  Yas Bernard is a 63 y.o. 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  In ED found to be hypoxic, hyperkalemic and in TARUN.  Paracentesis of 4 L done.  Transferred to ICU on 12/28 with hypercarbic resp failure on BiPAP    Interval Problem Update  12/31: Patient seen on medical unit.  She has diffuse dry skin in multiple areas of abrasions from scratching.  Added Eucerin cream twice daily decrease Lasix 40 IV every 12.  Added fluid restrictions and salt restrictions.  Echo EF 60 to 65%.  Patient remains on 3 L/min nasal cannula.  Creatinine stable.  Lungs clear to auscultation bilaterally.  Added Dulera inhaler daily. Check RUQ u/s.  1/1:  pending RUQ u.s. still on 3 LPM NC, ordered home oxygen evaluation.  Patient feeling better today.  PT/OT rec HH and FWW.  Will see if also needs home O2.    I have discussed this patient's plan of care and discharge plan at IDT rounds today with Case Management, Nursing, Nursing leadership, and other members of the IDT team.    Consultants/Specialty      Code Status  Full Code    Disposition  The patient is not medically cleared for discharge to home or a post-acute facility.  Anticipate discharge to: home with organized home healthcare and close outpatient follow-up    I have placed the appropriate orders for post-discharge needs.    Review of Systems  Review of Systems   Constitutional:  Positive for malaise/fatigue. Negative for chills and fever.   HENT:  Negative for nosebleeds and sore throat.    Eyes:  Negative for blurred vision and double vision.   Respiratory:  Positive for cough and shortness of breath.    Cardiovascular:  Negative for chest pain, palpitations and leg swelling.   Gastrointestinal:  Negative for abdominal  pain, diarrhea, nausea and vomiting.   Genitourinary:  Negative for dysuria and urgency.   Musculoskeletal:  Negative for back pain.   Skin:  Negative for rash.   Neurological:  Negative for dizziness, loss of consciousness and headaches.        Physical Exam  Temp:  [35.9 °C (96.6 °F)-36.4 °C (97.6 °F)] 36.2 °C (97.1 °F)  Pulse:  [61-82] 63  Resp:  [18-19] 18  BP: (107-132)/(50-72) 132/63  SpO2:  [89 %-91 %] 89 %    Physical Exam  Constitutional:       General: She is not in acute distress.     Appearance: Normal appearance. She is well-developed. She is not diaphoretic.   HENT:      Head: Normocephalic and atraumatic.   Neck:      Vascular: No JVD.   Cardiovascular:      Rate and Rhythm: Normal rate and regular rhythm.      Heart sounds: No murmur heard.  Pulmonary:      Effort: Pulmonary effort is normal. No respiratory distress.      Breath sounds: No stridor. No wheezing or rales.   Abdominal:      General: There is distension.      Palpations: Abdomen is soft.      Tenderness: There is no abdominal tenderness. There is no guarding or rebound.      Comments: =FLUID WAVE   Musculoskeletal:         General: No tenderness.      Right lower leg: No edema.      Left lower leg: No edema.   Skin:     General: Skin is warm and dry.      Capillary Refill: Capillary refill takes less than 2 seconds.      Findings: No rash.      Comments: B LOWER LEG ERYTHEMA   Neurological:      Mental Status: She is oriented to person, place, and time.   Psychiatric:         Mood and Affect: Mood normal.         Behavior: Behavior normal.         Thought Content: Thought content normal.         Fluids    Intake/Output Summary (Last 24 hours) at 1/1/2024 1615  Last data filed at 1/1/2024 0900  Gross per 24 hour   Intake 120 ml   Output --   Net 120 ml       Laboratory  Recent Labs     12/30/23  0123 01/01/24  0309   WBC 8.7 7.6   RBC 4.53 4.91   HEMOGLOBIN 15.3 16.4*   HEMATOCRIT 46.7 50.1*   .1* 102.0*   MCH 33.8* 33.4*   MCHC  32.8 32.7   RDW 56.7* 56.5*   PLATELETCT 151* 142*   MPV 10.0 9.8     Recent Labs     12/30/23  0123 12/31/23  0314 01/01/24  0309   SODIUM 135 133* 134*   POTASSIUM 4.6 4.6 4.2   CHLORIDE 96 91* 91*   CO2 28 29 29   GLUCOSE 113* 89 88   BUN 45* 37* 30*   CREATININE 1.30 1.10 0.76   CALCIUM 8.5 8.7 8.9                     Imaging  EC-ECHOCARDIOGRAM COMPLETE W/O CONT   Final Result      US-EXTREMITY VENOUS LOWER BILAT   Final Result      CT-HEAD W/O   Final Result      No acute process.         US-RENAL   Final Result      Normal exam.      DX-CHEST-LIMITED (1 VIEW)   Final Result         No pulmonary infiltrates or consolidations are noted.      Cardiomegaly.      US-RUQ    (Results Pending)        Assessment/Plan  Type 2 diabetes mellitus (HCC)- (present on admission)  Assessment & Plan  A1c 6.4  Given ESLD and concern for nutritional status would prefer not to restrict her diet    Cellulitis of left lower extremity- (present on admission)  Assessment & Plan  Blood cultures neg  DC cefazolin and start keflex    Cirrhosis (HCC)- (present on admission)  Assessment & Plan  Check RUQ u/s.  Acute onset of ascites, worsening edema, though sober x 8 years.    Acute on chronic respiratory failure with hypoxia and hypercapnia (HCC)- (present on admission)  Assessment & Plan  History of COPD though not on home oxygen.  She was told she needed to be on it, and in fact had it at home, but insurance stopped covering it and she did not have the money to pay for it herself.  Continue diuresis: Currently on Lasix 80 mg 3 times daily.    12/31:  Decreased lasix 80 IV tid to 40 IV bid.  O2 and RT protocols  Mobilize  Encourage smoking cessation  No prior home oxygen.  Check for home O2 need.      Encephalopathy acute- (present on admission)  Assessment & Plan  Acute HE now resolved  Lactulose  rifaximin    Hyperkalemia- (present on admission)  Assessment & Plan  resolved    Acute kidney injury (HCC)- (present on  admission)  Assessment & Plan  Per chart review has normal baseline kidney function  Patient has been taking NSAIDs prior to her presentation  Has responded to paracentesis, and initiation of loop diuretic.  Continue to follow daily BMP and urine output      Elevated troponin- (present on admission)  Assessment & Plan  Clinically looks like demand ischemia  Trop marginally elevated   TTE pending  EKG reassuring  Cont Tele  No CP or apparent CP equivalent    Acute respiratory failure with hypoxia  Assessment & Plan  At this time she requires 5 to 8 L oxygen mask.  History of COPD, currently not in exacerbation  Etiology may include hepatopulmonary syndrome, congestive heart failure, pneumonia  Chest x-ray showed hazy bilateral opacities  BNP is elevated at 13,000  Plan to continue forced diuresis IV Lasix 60 mg twice daily  Obtain transthoracic echo, D-dimer  Monitor daily weight, input and output      COPD (chronic obstructive pulmonary disease) (HCC)- (present on admission)  Assessment & Plan  Not in exacerbation  Albuterol as needed  Supplemental oxygen    Ascites- (present on admission)  Assessment & Plan  Secondary to known liver cirrhosis  Status post evacuation of at least 4 L in ER of transparent  brown liquid  Now back on IV Lasix  Add Aldactone once done with acute diuresis           VTE prophylaxis:    enoxaparin ppx      I have performed a physical exam and reviewed and updated ROS and Plan today (1/1/2024). In review of yesterday's note (12/31/2023), there are no changes except as documented above.

## 2024-01-02 NOTE — CARE PLAN
The patient is Stable - Low risk of patient condition declining or worsening    Shift Goals  Clinical Goals: titrate NC O2 by 1L during the night with attempt for room air as baseline for pt.  Patient Goals: sleep      Progress made toward(s) clinical / shift goals:      Patient is not progressing towards the following goals:

## 2024-01-03 ENCOUNTER — PHARMACY VISIT (OUTPATIENT)
Dept: PHARMACY | Facility: MEDICAL CENTER | Age: 64
End: 2024-01-03
Payer: COMMERCIAL

## 2024-01-03 VITALS
TEMPERATURE: 97.1 F | HEIGHT: 63 IN | RESPIRATION RATE: 16 BRPM | DIASTOLIC BLOOD PRESSURE: 73 MMHG | HEART RATE: 65 BPM | OXYGEN SATURATION: 98 % | BODY MASS INDEX: 35.43 KG/M2 | WEIGHT: 199.96 LBS | SYSTOLIC BLOOD PRESSURE: 140 MMHG

## 2024-01-03 LAB
ANION GAP SERPL CALC-SCNC: 12 MMOL/L (ref 7–16)
BASOPHILS # BLD AUTO: 0.7 % (ref 0–1.8)
BASOPHILS # BLD: 0.05 K/UL (ref 0–0.12)
BUN SERPL-MCNC: 19 MG/DL (ref 8–22)
CALCIUM SERPL-MCNC: 9.4 MG/DL (ref 8.5–10.5)
CHLORIDE SERPL-SCNC: 91 MMOL/L (ref 96–112)
CO2 SERPL-SCNC: 31 MMOL/L (ref 20–33)
CREAT SERPL-MCNC: 0.66 MG/DL (ref 0.5–1.4)
EOSINOPHIL # BLD AUTO: 0.32 K/UL (ref 0–0.51)
EOSINOPHIL NFR BLD: 4.2 % (ref 0–6.9)
ERYTHROCYTE [DISTWIDTH] IN BLOOD BY AUTOMATED COUNT: 58 FL (ref 35.9–50)
GFR SERPLBLD CREATININE-BSD FMLA CKD-EPI: 98 ML/MIN/1.73 M 2
GLUCOSE SERPL-MCNC: 83 MG/DL (ref 65–99)
HCT VFR BLD AUTO: 53.1 % (ref 37–47)
HGB BLD-MCNC: 17.2 G/DL (ref 12–16)
IMM GRANULOCYTES # BLD AUTO: 0.04 K/UL (ref 0–0.11)
IMM GRANULOCYTES NFR BLD AUTO: 0.5 % (ref 0–0.9)
LYMPHOCYTES # BLD AUTO: 1.25 K/UL (ref 1–4.8)
LYMPHOCYTES NFR BLD: 16.4 % (ref 22–41)
MCH RBC QN AUTO: 33.7 PG (ref 27–33)
MCHC RBC AUTO-ENTMCNC: 32.4 G/DL (ref 32.2–35.5)
MCV RBC AUTO: 103.9 FL (ref 81.4–97.8)
MONOCYTES # BLD AUTO: 1.39 K/UL (ref 0–0.85)
MONOCYTES NFR BLD AUTO: 18.2 % (ref 0–13.4)
NEUTROPHILS # BLD AUTO: 4.58 K/UL (ref 1.82–7.42)
NEUTROPHILS NFR BLD: 60 % (ref 44–72)
NRBC # BLD AUTO: 0 K/UL
NRBC BLD-RTO: 0 /100 WBC (ref 0–0.2)
PLATELET # BLD AUTO: 131 K/UL (ref 164–446)
PMV BLD AUTO: 10.5 FL (ref 9–12.9)
POTASSIUM SERPL-SCNC: 4.5 MMOL/L (ref 3.6–5.5)
RBC # BLD AUTO: 5.11 M/UL (ref 4.2–5.4)
SODIUM SERPL-SCNC: 134 MMOL/L (ref 135–145)
WBC # BLD AUTO: 7.6 K/UL (ref 4.8–10.8)

## 2024-01-03 PROCEDURE — 700102 HCHG RX REV CODE 250 W/ 637 OVERRIDE(OP)

## 2024-01-03 PROCEDURE — RXMED WILLOW AMBULATORY MEDICATION CHARGE: Performed by: INTERNAL MEDICINE

## 2024-01-03 PROCEDURE — 99239 HOSP IP/OBS DSCHRG MGMT >30: CPT | Performed by: INTERNAL MEDICINE

## 2024-01-03 PROCEDURE — 85025 COMPLETE CBC W/AUTO DIFF WBC: CPT

## 2024-01-03 PROCEDURE — 700102 HCHG RX REV CODE 250 W/ 637 OVERRIDE(OP): Performed by: INTERNAL MEDICINE

## 2024-01-03 PROCEDURE — A9270 NON-COVERED ITEM OR SERVICE: HCPCS

## 2024-01-03 PROCEDURE — A9270 NON-COVERED ITEM OR SERVICE: HCPCS | Performed by: INTERNAL MEDICINE

## 2024-01-03 PROCEDURE — 80048 BASIC METABOLIC PNL TOTAL CA: CPT

## 2024-01-03 PROCEDURE — 36415 COLL VENOUS BLD VENIPUNCTURE: CPT

## 2024-01-03 RX ADMIN — LACTULOSE 45 ML: 20 SOLUTION ORAL at 05:06

## 2024-01-03 RX ADMIN — MOMETASONE FUROATE AND FORMOTEROL FUMARATE DIHYDRATE 2 PUFF: 200; 5 AEROSOL RESPIRATORY (INHALATION) at 05:09

## 2024-01-03 RX ADMIN — FUROSEMIDE 40 MG: 40 TABLET ORAL at 05:06

## 2024-01-03 RX ADMIN — SKIN PROTECTANT: 33 OINTMENT TOPICAL at 05:09

## 2024-01-03 RX ADMIN — ALBUTEROL SULFATE 2 PUFF: 90 AEROSOL, METERED RESPIRATORY (INHALATION) at 05:09

## 2024-01-03 RX ADMIN — ALBUTEROL SULFATE 2 PUFF: 90 AEROSOL, METERED RESPIRATORY (INHALATION) at 10:00

## 2024-01-03 RX ADMIN — SPIRONOLACTONE 100 MG: 100 TABLET ORAL at 05:06

## 2024-01-03 RX ADMIN — RIFAXIMIN 550 MG: 550 TABLET ORAL at 05:06

## 2024-01-03 ASSESSMENT — PAIN DESCRIPTION - PAIN TYPE
TYPE: ACUTE PAIN
TYPE: ACUTE PAIN

## 2024-01-03 NOTE — DISCHARGE PLANNING
DC Transport Scheduled    Received request at: 1/3/2024 at 0936    Transport Company Scheduled:  [employer]  Spoke with Kary at [facility] to schedule transport.  [facility] Trip #: [id number]     Scheduled Date: 1/3/2024  Scheduled Time: 9242-9303    Destination: [facility] at [address] [place]     Notified care team of scheduled transport via Vo[employer]te.     If there are any changes needed to the DC transportation scheduled, please contact [facility] at ext. [phone] between the hours of 4010-9370 Mon-Fri. If outside those hours, contact the ED Case Manager at ext. [phone].

## 2024-01-03 NOTE — PROGRESS NOTES
Rec'd report from day shift RN. Assumed pt care. Assessment completed. AA&OX4. Denies pain at this time. No s/s of discomfort or distress. BLE are rufino. Abdomen is semi-firm. Generalized scabs everywhere. Bed in lowest position, bed locked, treaded socks in place, RN and CNA numbers provided, call light within reach.

## 2024-01-03 NOTE — CARE PLAN
The patient is Stable - Low risk of patient condition declining or worsening    Shift Goals  Clinical Goals: No SOB during the night with anticipation to D/C tomorrow  Patient Goals: go home      Progress made toward(s) clinical / shift goals:      Patient is not progressing towards the following goals:

## 2024-01-03 NOTE — DISCHARGE SUMMARY
Discharge Summary    CHIEF COMPLAINT ON ADMISSION  Chief Complaint   Patient presents with    Shortness of Breath     BIB EMS; transfer from [place]; SOB x 1 week; denies any chest pain, N/V; AOX4 GCS15       Reason for Admission  transfer     Admission Date  12/28/2023    CODE STATUS  Prior    HPI & HOSPITAL COURSE  This is a 63 y.o. female here with weakness.    64yo PMHx cirrhosis, COPD, PAH, DM, ascending aortic aneurysm, tobacco use, distant Hx of ETOH but now sober for 8 years presenting with weakness. In ED found to be hypoxic, hyperkalemic and in TARUN. Paracentesis of 4 L done. Transferred to ICU on 12/28 with hypercarbic resp failure on BiPAP.  Patient was aggressively diuresed and was able to be downgraded to the medical floor.  GI was consulted for patient's cirrhosis recommended Aldactone 100 mg daily, Lasix 40 mg daily on discharge for management of ascites and to continue lactulose and rifaximin with titration to 2-3 loose bowel movements per day.  They cleared the patient for discharge.  Patient's breathing improved and she was titrated down to 3 L nasal cannula.  Home oxygen evaluation was performed and she was set up with home oxygen prior to discharge.  She has been advised to follow-up with gastroenterology for further management of her cirrhosis as well as primary care.  She has been made a primary care appointment on 1/8/2024.  Patient was also noted to be polycythemic hemoglobin 17 on day of discharge likely from her COPD and chronic hypoxia.  She will likely need outpatient phlebotomy and I have sent a referral for her to follow-up with hematology as an outpatient.  PT/OT evaluated the patient recommended home health.  Unfortunately patient does not qualify for home health with her insurance I have placed a referral for her to follow-up outpatient with PT/OT.  Patient's vital signs are stable and she is requesting to be discharged home.  Her medications were delivered to bedside prior to  discharge.  She is to follow-up as noted above and return to the ER if her condition worsens.    Therefore, she is discharged in good and stable condition to home with close outpatient follow-up.    The patient met 2-midnight criteria for an inpatient stay at the time of discharge.    Discharge Date  1/3/2023    FOLLOW UP ITEMS POST DISCHARGE  Follow-up with primary care   Referral placed to follow-up with hematology oncology for polycythemia  Referral placed to follow-up with gastroenterology for cirrhosis    DISCHARGE DIAGNOSES  Active Problems:    Ascites (POA: Yes)    COPD (chronic obstructive pulmonary disease) (HCC) (POA: Yes)    Elevated troponin (POA: Yes)    Acute kidney injury (HCC) (POA: Yes)    Hyperkalemia (POA: Yes)    Encephalopathy acute (POA: Yes)    Acute on chronic respiratory failure with hypoxia and hypercapnia (HCC) (POA: Yes)    Cirrhosis (HCC) (POA: Yes)    Cellulitis of left lower extremity (POA: Yes)    Erythrocytosis (POA: Yes)    Pulmonary hypertension (HCC) (POA: Yes)    Type 2 diabetes mellitus (HCC) (POA: Yes)  Resolved Problems:    * No resolved hospital problems. *      FOLLOW UP  Future Appointments   Date Time Provider Department Center   1/8/2024  3:00 PM Armando R Reyes Yparraguirre, M.D. [facility]     No follow-up provider specified.    MEDICATIONS ON DISCHARGE     Medication List        START taking these medications        Instructions   acetaminophen 325 MG Tabs  Commonly known as: Tylenol   Take 2 Tablets by mouth every 6 hours as needed for Fever, Moderate Pain or Mild Pain.  Dose: 650 mg     Dulera 200-5 MCG/ACT Aero  Generic drug: mometasone-formoterol   Inhale 2 Puffs 2 times a day for 30 days.  Dose: 2 Puff     furosemide 40 MG Tabs  Commonly known as: Lasix   Take 1 Tablet by mouth every day.  Dose: 40 mg     lactulose 10 GM/15ML Soln   Take 45 mL by mouth 3 times a day for 30 days.  Dose: 45 mL     spironolactone 100 MG Tabs  Commonly known as: Aldactone    Take 1 Tablet by mouth every day.  Dose: 100 mg     Xifaxan 550 MG Tabs tablet  Generic drug: riFAXIMin   Take 1 Tablet by mouth 2 times a day for 30 days.  Dose: 550 mg            CHANGE how you take these medications        Instructions   gabapentin 300 MG Caps  What changed: when to take this  Commonly known as: Neurontin   Take 2 Capsules by mouth every evening for 30 days.  Dose: 600 mg            CONTINUE taking these medications        Instructions   albuterol 108 (90 Base) MCG/ACT Aers inhalation aerosol   Inhale 2 Puffs every 4 hours.  Dose: 2 Puff     clindamycin 1 % Gel   Apply  to affected area(s) 2 Times a Day. use thin film on affected area     clobetasol 0.05 % Oint  Commonly known as: Temovate   Apply  to affected area(s) 2 Times a Day. Apply as directed above     diphenhydrAMINE-ZnAcetate 1-0.1 % Crea  Commonly known as: Benadryl Itch   Apply  to affected area(s) 2 Times a Day. Apply as directed     pramoxine-calamine 1-8% lotion  Commonly known as: Caladryl   Apply 1 Inch to affected area(s) 4 times a day.  Dose: 1 Inch              Allergies  Allergies   Allergen Reactions    Penicillins Swelling     Throat swelling       DIET  No orders of the defined types were placed in this encounter.      ACTIVITY  As tolerated.  Weight bearing as tolerated    CONSULTATIONS  Critical care  Gastroenterology    PROCEDURES  none    LABORATORY  Lab Results   Component Value Date    SODIUM 134 (L) 01/03/2024    POTASSIUM 4.5 01/03/2024    CHLORIDE 91 (L) 01/03/2024    CO2 31 01/03/2024    GLUCOSE 83 01/03/2024    BUN 19 01/03/2024    CREATININE 0.66 01/03/2024        Lab Results   Component Value Date    WBC 7.6 01/03/2024    HEMOGLOBIN 17.2 (H) 01/03/2024    HEMATOCRIT 53.1 (H) 01/03/2024    PLATELETCT 131 (L) 01/03/2024        Total time of the discharge process exceeds 37 minutes.

## 2024-01-03 NOTE — CARE PLAN
The patient is Stable - Low risk of patient condition declining or worsening    Shift Goals  Clinical Goals: DC today  Patient Goals: DC today  Family Goals: gisell    Progress made toward(s) clinical / shift goals:  Pt discharging home. Transport to  soon. On 3L nasal cannula, going home with oxygen and FWW. Meds picked up from pharmacy and given to patient. AVS reviewed, IV removed and pt states leaving with all belongings. No acute distress noted. Pt remains free of falls.     Patient is not progressing towards the following goals:

## 2024-01-03 NOTE — DISCHARGE PLANNING
Case Management Discharge Planning    Admission Date: 12/28/2023  GMLOS: 4.9  ALOS: 6    6-Clicks ADL Score: 21  6-Clicks Mobility Score: 18      Anticipated Discharge Dispo: Discharge Disposition: Discharged to home/self care (01)  Discharge Address: [address]  [address]  Discharge Contact Phone Number: [phone]    DME Needed: Yes    DME Ordered: Yes    Action(s) Taken: Updated Provider/Nurse on Discharge Plan    1046 Transportation set up to [facility] in [facility] via [facility] for ; pt and bedside RN notfied. Oxygen and walker at the bedside. Pt states she feels stable to drive herself home after picking her car up.       Escalations Completed: None    Medically Clear: Yes    Next Steps: RNCM to continue to follow up with pt and medical team to address dc needs and barriers      Barriers to Discharge: None

## 2024-01-05 ENCOUNTER — TELEPHONE (OUTPATIENT)
Dept: HEALTH INFORMATION MANAGEMENT | Facility: OTHER | Age: 64
End: 2024-01-05

## 2024-01-08 ENCOUNTER — APPOINTMENT (OUTPATIENT)
Dept: INTERNAL MEDICINE | Facility: OTHER | Age: 64
End: 2024-01-08
Payer: MEDICAID

## 2024-01-08 NOTE — DOCUMENTATION QUERY
[facility]                                                                       Query Response Note      PATIENT:               GASPER GODINEZ  ACCT #:                  [id number]  MRN:                     [id number]  :                      1960  ADMIT DATE:       2023 12:21 AM  DISCH DATE:        1/3/2024 12:41 PM  RESPONDING  PROVIDER #:        [id number]           QUERY TEXT:    Encephalopathy is documented in the Medical Record. Please specify type.    The patient's Clinical Indicators include:  CLINICAL INDICATORS  per H&P:  Encephalopathy acute  Acute metabolic versus toxic  Plan: To check ammonia, vitamin B12, TSH, bladder scan, CT head  Fall, aspiration precautions    Per PN :  Encephalopathy acute- (present on admission)  Assessment & Plan  Acute HE now resolved  Lactulose  rifaximin    : Head CT  FINDINGS:   Brain: No intracranial mass, hydrocephalus, herniation, hemorrhage, or extra-axial fluid collection. Normal gray-white matter differentiation.     TREATMENT  -evacuation of at least 4 L in ER of transparent  brown liquid  -Lactulose & rifaximin  --BiPAP/CPAP at night    RISK FACTORS  -TARUN  -acute respiratory failure- HX of COPD  -Cirrhosis with ascites-HX of alcohol abuse- sober for 8 years     CONTACT  Mariela Kang CCS  Senior -Inpatient, [place]  [email or web address]  Options provided:   -- Alcoholic encephalopathy   -- Due to medications or drugs   -- Hepatic encephalopathy without coma   -- Hypertensive encephalopathy   -- Metabolic encephalopathy   -- Toxic encephalopathy   -- Other type of encephalopathy   -- Other explanation, (please specify other explanation)   -- Unable to determine      Query created by: Mariela Kang on 2024 12:45 PM    RESPONSE TEXT:    Hepatic encephalopathy without coma          Electronically signed by:  ILDA HUANG MD 2024 1:28 PM